# Patient Record
Sex: FEMALE | Race: WHITE | NOT HISPANIC OR LATINO | Employment: FULL TIME | URBAN - METROPOLITAN AREA
[De-identification: names, ages, dates, MRNs, and addresses within clinical notes are randomized per-mention and may not be internally consistent; named-entity substitution may affect disease eponyms.]

---
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
## 2020-07-01 VITALS
SYSTOLIC BLOOD PRESSURE: 136 MMHG | HEART RATE: 102 BPM | DIASTOLIC BLOOD PRESSURE: 88 MMHG | RESPIRATION RATE: 18 BRPM | TEMPERATURE: 98.6 F | WEIGHT: 203 LBS | HEIGHT: 67 IN | OXYGEN SATURATION: 97 % | BODY MASS INDEX: 31.86 KG/M2

## 2020-07-02 ENCOUNTER — HOSPITAL ENCOUNTER (EMERGENCY)
Facility: HOSPITAL | Age: 20
End: 2020-07-02

## 2020-07-09 ENCOUNTER — OFFICE VISIT (OUTPATIENT)
Dept: ORTHOPEDIC SURGERY | Facility: CLINIC | Age: 20
End: 2020-07-09

## 2020-07-09 VITALS — HEIGHT: 67 IN | BODY MASS INDEX: 36.1 KG/M2 | OXYGEN SATURATION: 98 % | HEART RATE: 67 BPM | WEIGHT: 230 LBS

## 2020-07-09 DIAGNOSIS — S62.664A CLOSED NONDISPLACED FRACTURE OF DISTAL PHALANX OF RIGHT RING FINGER, INITIAL ENCOUNTER: ICD-10-CM
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
## 2020-07-09 DIAGNOSIS — M79.644 PAIN OF FINGER OF RIGHT HAND: Primary | ICD-10-CM

## 2020-07-09 DIAGNOSIS — S62.662A CLOSED NONDISPLACED FRACTURE OF DISTAL PHALANX OF RIGHT MIDDLE FINGER, INITIAL ENCOUNTER: ICD-10-CM

## 2020-07-09 PROCEDURE — 99204 OFFICE O/P NEW MOD 45 MIN: CPT | Performed by: PHYSICIAN ASSISTANT
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
## 2020-11-02 ENCOUNTER — HOSPITAL ENCOUNTER (EMERGENCY)
Facility: HOSPITAL | Age: 20
Discharge: HOME | End: 2020-11-02
Attending: EMERGENCY MEDICINE | Admitting: EMERGENCY MEDICINE

## 2020-11-02 VITALS
TEMPERATURE: 97.8 F | RESPIRATION RATE: 16 BRPM | WEIGHT: 200 LBS | SYSTOLIC BLOOD PRESSURE: 119 MMHG | OXYGEN SATURATION: 96 % | HEART RATE: 107 BPM | BODY MASS INDEX: 31.39 KG/M2 | DIASTOLIC BLOOD PRESSURE: 70 MMHG | HEIGHT: 67 IN

## 2020-11-02 DIAGNOSIS — J02.9 ACUTE PHARYNGITIS, UNSPECIFIED ETIOLOGY: Primary | ICD-10-CM

## 2020-11-02 PROCEDURE — 25010000002 PENICILLIN G BENZATHINE PER 1200000 UNITS: Performed by: EMERGENCY MEDICINE

## 2020-11-02 PROCEDURE — 96372 THER/PROPH/DIAG INJ SC/IM: CPT

## 2020-11-02 PROCEDURE — 99283 EMERGENCY DEPT VISIT LOW MDM: CPT

## 2020-11-02 RX ORDER — TRAMADOL HYDROCHLORIDE 50 MG/1
50 TABLET ORAL EVERY 6 HOURS PRN
Qty: 12 TABLET | Refills: 0 | Status: SHIPPED | OUTPATIENT
Start: 2020-11-02 | End: 2022-12-22

## 2020-11-02 RX ORDER — PREDNISONE 20 MG/1
20 TABLET ORAL 3 TIMES DAILY
Qty: 15 TABLET | Refills: 0 | Status: SHIPPED | OUTPATIENT
Start: 2020-11-02 | End: 2022-12-22

## 2020-11-02 RX ADMIN — PENICILLIN G BENZATHINE 1.2 MILLION UNITS: 1200000 INJECTION, SUSPENSION INTRAMUSCULAR at 10:14

## 2020-11-02 NOTE — DISCHARGE INSTRUCTIONS
Take Tylenol or ibuprofen as needed for fever.    Take Ultram as needed for severe pain not controlled by Tylenol or ibuprofen    Take prednisone as prescribed.    Follow-up with primary care physician for recheck in 2 to 3 days and return to the ER with any further concern.

## 2020-11-02 NOTE — ED PROVIDER NOTES
Subjective   20-year-old female presents with a complaint of sore throat.  She reports she began to have as an itchy throat 2 days ago.  There is some mild radiation of pain to the right ear, no reported left ear pain.  Today she noted a temperature of 100.2 and reports is more painful to swallow.  She reports mild runny nose.  She denies any cough, chest pain, or shortness of breath.  She denies any abdominal pain, no nausea or vomiting, no change in bowel or urinary function.  No definitive sick contacts reported.  No other major medical problems.  She has been taking over-the-counter pain, at home without significant relief of symptoms.  No other reported aggravating, alleviating, or associated symptoms.          Review of Systems   Constitutional: Negative for chills, fatigue and fever.   HENT: Positive for rhinorrhea and sore throat. Negative for congestion, ear pain, postnasal drip and sinus pressure.    Eyes: Negative for pain, redness and visual disturbance.   Respiratory: Negative for cough, chest tightness and shortness of breath.    Cardiovascular: Negative for chest pain, palpitations and leg swelling.   Gastrointestinal: Negative for abdominal pain, anal bleeding, blood in stool, diarrhea, nausea and vomiting.   Endocrine: Negative for polydipsia and polyuria.   Genitourinary: Negative for difficulty urinating, dysuria, frequency and urgency.   Musculoskeletal: Negative for arthralgias, back pain and neck pain.   Skin: Negative for pallor and rash.   Allergic/Immunologic: Negative for environmental allergies and immunocompromised state.   Neurological: Negative for dizziness, weakness and headaches.   Hematological: Negative for adenopathy.   Psychiatric/Behavioral: Negative for confusion, self-injury and suicidal ideas. The patient is not nervous/anxious.    All other systems reviewed and are negative.      Past Medical History:   Diagnosis Date   • Anemia    • History of blood transfusion 2012   •  "Miscarriage    • Pregnancy    • Urinary tract infection     \"kept one this pregnancy\"       Allergies   Allergen Reactions   • Tylenol [Acetaminophen] Nausea And Vomiting       History reviewed. No pertinent surgical history.    Family History   Problem Relation Age of Onset   • Diabetes Father    • Cancer Mother        Social History     Socioeconomic History   • Marital status: Single     Spouse name: Not on file   • Number of children: Not on file   • Years of education: Not on file   • Highest education level: Not on file   Tobacco Use   • Smoking status: Current Every Day Smoker     Packs/day: 0.25     Types: Cigarettes   Substance and Sexual Activity   • Alcohol use: No   • Drug use: No   • Sexual activity: Yes     Partners: Male     Birth control/protection: None           Objective   Physical Exam  Vitals signs and nursing note reviewed.   Constitutional:       General: She is not in acute distress.     Appearance: Normal appearance. She is well-developed. She is not toxic-appearing or diaphoretic.   HENT:      Head: Normocephalic and atraumatic.      Right Ear: External ear normal.      Left Ear: External ear normal.      Nose: Nose normal.      Mouth/Throat:      Palate: No mass.      Pharynx: Uvula midline. Pharyngeal swelling and posterior oropharyngeal erythema present.      Tonsils: Tonsillar exudate present.     Eyes:      General: Lids are normal.      Pupils: Pupils are equal, round, and reactive to light.   Neck:      Musculoskeletal: Normal range of motion and neck supple.      Trachea: No tracheal deviation.   Cardiovascular:      Rate and Rhythm: Normal rate and regular rhythm.      Pulses: No decreased pulses.      Heart sounds: Normal heart sounds. No murmur. No friction rub. No gallop.    Pulmonary:      Effort: Pulmonary effort is normal. No respiratory distress.      Breath sounds: Normal breath sounds. No decreased breath sounds, wheezing, rhonchi or rales.   Abdominal:      General: Bowel " sounds are normal.      Palpations: Abdomen is soft.      Tenderness: There is no abdominal tenderness. There is no guarding or rebound.   Musculoskeletal: Normal range of motion.         General: No deformity.   Lymphadenopathy:      Cervical: No cervical adenopathy.   Skin:     General: Skin is warm and dry.      Findings: No rash.   Neurological:      Mental Status: She is alert and oriented to person, place, and time.      Cranial Nerves: No cranial nerve deficit.      Sensory: No sensory deficit.   Psychiatric:         Speech: Speech normal.         Behavior: Behavior normal.         Thought Content: Thought content normal.         Judgment: Judgment normal.         Procedures           ED Course                                           MDM  Number of Diagnoses or Management Options  Acute pharyngitis, unspecified etiology: new and requires workup  Diagnosis management comments: The patient's throat looks concerning for strep versus mono.  Given mono is a virus and do not feel further evaluation for that is warranted at this given time.    I discussed performing a strep swab with the patient versus just treating.  She does desires to pursue just treatment and request a single IM dose of penicillin.    Benzathine penicillin has been ordered.    Patient will be discharged with the advised to take Tylenol or ibuprofen as needed for fever.  Discharged with Ultram to take as needed for severe pain and prednisone to decrease throat swelling.    Advised to follow-up with primary care physician for recheck in 1 week.       Amount and/or Complexity of Data Reviewed  Review and summarize past medical records: yes  Independent visualization of images, tracings, or specimens: yes        Final diagnoses:   Acute pharyngitis, unspecified etiology            Kayleen Elizalde MD  11/02/20 [id number]

## 2022-12-22 ENCOUNTER — INITIAL PRENATAL (OUTPATIENT)
Dept: OBSTETRICS AND GYNECOLOGY | Facility: CLINIC | Age: 22
End: 2022-12-22

## 2022-12-22 VITALS — DIASTOLIC BLOOD PRESSURE: 70 MMHG | SYSTOLIC BLOOD PRESSURE: 112 MMHG | WEIGHT: 206 LBS | BODY MASS INDEX: 32.26 KG/M2

## 2022-12-22 DIAGNOSIS — Z12.4 SCREENING FOR CERVICAL CANCER: ICD-10-CM

## 2022-12-22 DIAGNOSIS — Z34.81 ENCOUNTER FOR SUPERVISION OF OTHER NORMAL PREGNANCY IN FIRST TRIMESTER: Primary | ICD-10-CM

## 2022-12-22 DIAGNOSIS — O36.80X0 ENCOUNTER TO DETERMINE FETAL VIABILITY OF PREGNANCY, SINGLE OR UNSPECIFIED FETUS: ICD-10-CM

## 2022-12-22 PROCEDURE — 99203 OFFICE O/P NEW LOW 30 MIN: CPT | Performed by: MIDWIFE

## 2022-12-22 NOTE — PROGRESS NOTES
"Subjective     Chief Complaint   Patient presents with   • Initial Prenatal Visit     No Complaints/concerns        Sari Newton is a 22 y.o. .  No LMP recorded. Patient is pregnant..  She presents to be seen to initiate prenatal care with our practice. She has had 2 previous uncomplicated pregnancies with . She has decreased her smoking from 1/2 ppd to 4 cigs daily.    Past Medical History:   Diagnosis Date   • Anemia    • History of blood transfusion    • Miscarriage    • Pregnancy    • Urinary tract infection     \"kept one this pregnancy\"     Social History     Socioeconomic History   • Marital status: Single   Tobacco Use   • Smoking status: Every Day     Packs/day: 0.25     Types: Cigarettes   Vaping Use   • Vaping Use: Never used   Substance and Sexual Activity   • Alcohol use: No   • Drug use: No   • Sexual activity: Yes     Partners: Male     Birth control/protection: None         The following portions of the patient's history were reviewed and updated as appropriate:vital signs, allergies, current medications, past medical history, past social history, past surgical history and problem list.    Review of Systems -   /70   Wt 93.4 kg (206 lb)   BMI 32.26 kg/m²   Gastrointestinal: Minimal nausea and vomiting, denies constipation   Genitourinary: Frequency - denies urgency or burning with urination  All other systems reviewed and are negative    Objective     Physical Exam  Constitutional   The patient is alert, well developed & well nourished.   Neck   The neck is supple and the trachea is midline. The thyroid is not enlarged and there are no palpable nodules.   Respiratory  The patient is relaxed and breathes without effort. Lungs CTAB  Cardiovascular  Regular rate and rhythm without murmur -  Negative LE pitting edema  Gastrointestinal   The abdomen is soft and non tender. No hepatosplenomegaly  Genitourinary   - External Genitalia without erythema, lesions, or masses  -Vagina - " There is no abnormal vaginal discharge.   -Cervix without discharge, anterior.  Negative cervical motion tenderness   Uterus - uterine body size is approximate to dates  Adnexa structures are without masses  Perineum is without inflammation or lesion  Skin  Normal color. No rashes or lesions  Extremities  Full ROM. No rashes or edema  Psychiatric  The patient is oriented to person, place, and time. Speech is fluent and words are clear    Imaging   Pelvic ultrasound report  9w4d, + FHT      Assessment & Plan     ASSESSMENT  1. IUP at 9w4d   2. Normal pregnancy  3. smoker  4. First trimester discomforts of pregnancy.     PLAN  1. Tests ordered today:  Orders Placed This Encounter   Procedures   • OB Panel With HIV     Order Specific Question:   Release to patient     Answer:   Routine Release   • Urine Drug Screen - Urine, Clean Catch     Order Specific Question:   Release to patient     Answer:   Routine Release     2. Medications prescribed today:  No orders of the defined types were placed in this encounter.    3. Information reviewed: smoking in pregnancy, exercise in pregnancy, nutrition in pregnancy, weight gain in pregnancy, work and travel restrictions during pregnancy, list of OTC medications acceptable in pregnancy and call coverage groups  4. Genetic testing reviewed: Cystic Fibrosis Screen  5. Najma products, Vit B6 supp, Unisom, or seabands PRN      Follow up: 4 week(s)         This note was electronically signed.    Rakel Eller CNM  12/22/2022

## 2022-12-24 LAB
ABO GROUP BLD: ABNORMAL
AMPHETAMINES UR QL SCN: NEGATIVE NG/ML
BARBITURATES UR QL SCN: NEGATIVE NG/ML
BASOPHILS # BLD AUTO: 0 X10E3/UL (ref 0–0.2)
BASOPHILS NFR BLD AUTO: 0 %
BENZODIAZ UR QL SCN: NEGATIVE NG/ML
BLD GP AB SCN SERPL QL: NEGATIVE
BZE UR QL SCN: NEGATIVE NG/ML
CANNABINOIDS UR QL SCN: NEGATIVE NG/ML
CREAT UR-MCNC: 81.6 MG/DL (ref 20–300)
EOSINOPHIL # BLD AUTO: 0 X10E3/UL (ref 0–0.4)
EOSINOPHIL NFR BLD AUTO: 0 %
ERYTHROCYTE [DISTWIDTH] IN BLOOD BY AUTOMATED COUNT: 11.9 % (ref 11.7–15.4)
HBV SURFACE AG SERPL QL IA: NEGATIVE
HCT VFR BLD AUTO: 40.9 % (ref 34–46.6)
HCV AB S/CO SERPL IA: <0.1 S/CO RATIO (ref 0–0.9)
HGB BLD-MCNC: 14 G/DL (ref 11.1–15.9)
HIV 1+2 AB+HIV1 P24 AG SERPL QL IA: NON REACTIVE
IMM GRANULOCYTES # BLD AUTO: 0 X10E3/UL (ref 0–0.1)
IMM GRANULOCYTES NFR BLD AUTO: 0 %
LABORATORY COMMENT REPORT: NORMAL
LYMPHOCYTES # BLD AUTO: 1.8 X10E3/UL (ref 0.7–3.1)
LYMPHOCYTES NFR BLD AUTO: 22 %
MCH RBC QN AUTO: 34.2 PG (ref 26.6–33)
MCHC RBC AUTO-ENTMCNC: 34.2 G/DL (ref 31.5–35.7)
MCV RBC AUTO: 100 FL (ref 79–97)
METHADONE UR QL SCN: NEGATIVE NG/ML
MONOCYTES # BLD AUTO: 0.6 X10E3/UL (ref 0.1–0.9)
MONOCYTES NFR BLD AUTO: 7 %
NEUTROPHILS # BLD AUTO: 5.7 X10E3/UL (ref 1.4–7)
NEUTROPHILS NFR BLD AUTO: 71 %
OPIATES UR QL SCN: NEGATIVE NG/ML
OXYCODONE+OXYMORPHONE UR QL SCN: NEGATIVE NG/ML
PCP UR QL: NEGATIVE NG/ML
PH UR: 6.3 [PH] (ref 4.5–8.9)
PLATELET # BLD AUTO: 203 X10E3/UL (ref 150–450)
PROPOXYPH UR QL SCN: NEGATIVE NG/ML
RBC # BLD AUTO: 4.09 X10E6/UL (ref 3.77–5.28)
RH BLD: POSITIVE
RPR SER QL: NON REACTIVE
RUBV IGG SERPL IA-ACNC: 5.25 INDEX
WBC # BLD AUTO: 8.2 X10E3/UL (ref 3.4–10.8)

## 2022-12-29 LAB — REF LAB TEST METHOD: NORMAL

## 2023-01-16 ENCOUNTER — ROUTINE PRENATAL (OUTPATIENT)
Dept: OBSTETRICS AND GYNECOLOGY | Facility: CLINIC | Age: 23
End: 2023-01-16
Payer: COMMERCIAL

## 2023-01-16 VITALS — SYSTOLIC BLOOD PRESSURE: 108 MMHG | WEIGHT: 209 LBS | BODY MASS INDEX: 32.73 KG/M2 | DIASTOLIC BLOOD PRESSURE: 68 MMHG

## 2023-01-16 DIAGNOSIS — O21.9 NAUSEA AND VOMITING IN PREGNANCY: ICD-10-CM

## 2023-01-16 DIAGNOSIS — K59.00 CONSTIPATION, UNSPECIFIED CONSTIPATION TYPE: ICD-10-CM

## 2023-01-16 DIAGNOSIS — Z34.91 PRENATAL CARE IN FIRST TRIMESTER: Primary | ICD-10-CM

## 2023-01-16 PROCEDURE — 99214 OFFICE O/P EST MOD 30 MIN: CPT | Performed by: OBSTETRICS & GYNECOLOGY

## 2023-01-16 RX ORDER — DIPHENHYDRAMINE HYDROCHLORIDE 25 MG/1
25 CAPSULE ORAL NIGHTLY
Qty: 30 TABLET | Refills: 5 | Status: SHIPPED | OUTPATIENT
Start: 2023-01-16

## 2023-01-16 RX ORDER — METOCLOPRAMIDE 10 MG/1
10 TABLET ORAL
Qty: 30 TABLET | Refills: 5 | Status: SHIPPED | OUTPATIENT
Start: 2023-01-16

## 2023-01-16 RX ORDER — ALUMINUM ZIRCONIUM OCTACHLOROHYDREX GLY 16 G/100G
GEL TOPICAL DAILY
Qty: 283 G | Refills: 3 | Status: SHIPPED | OUTPATIENT
Start: 2023-01-16

## 2023-01-16 RX ORDER — DOCUSATE SODIUM 100 MG/1
100 CAPSULE, LIQUID FILLED ORAL 2 TIMES DAILY
Qty: 60 CAPSULE | Refills: 1 | Status: SHIPPED | OUTPATIENT
Start: 2023-01-16

## 2023-01-16 NOTE — PROGRESS NOTES
Prenatal Care Visit    Subjective   Chief Complaint   Patient presents with   • Routine Prenatal Visit     Nausea       History:   Sari is a  currently at 13w1d who presents for a prenatal care visit today.    Worsening nausea and emesis and constipation.    Social History    Tobacco Use      Smoking status: Every Day        Packs/day: 0.25        Types: Cigarettes      Smokeless tobacco: Not on file       Objective   /68   Wt 94.8 kg (209 lb)   BMI 32.73 kg/m²   Physical Exam:  Normal, gestational age-appropriate exam today        Plan   Medical Decision Making:    I have reviewed the prenatal labs and ultrasound(s) today. I have reviewed the most recent prenatal progress note(s).    Diagnosis: Supervision of high risk pregnancy   Nausea and emesis  Constipation   Tests/Orders/Rx today: No orders of the defined types were placed in this encounter.      Medication Management: B6/Unisom/Reglan + Metamucil/Colace     Topics discussed: Prenatal care milestones  Nausea + Meds   Constipation + Meds   Tests next visit: U/S for anatomic screening   Next visit: 5 week(s)     Samuel Aaron MD  Obstetrics and Gynecology  [facility]

## 2023-02-20 ENCOUNTER — ROUTINE PRENATAL (OUTPATIENT)
Dept: OBSTETRICS AND GYNECOLOGY | Facility: CLINIC | Age: 23
End: 2023-02-20
Payer: COMMERCIAL

## 2023-02-20 VITALS — WEIGHT: 214.2 LBS | BODY MASS INDEX: 33.55 KG/M2 | DIASTOLIC BLOOD PRESSURE: 64 MMHG | SYSTOLIC BLOOD PRESSURE: 102 MMHG

## 2023-02-20 DIAGNOSIS — Z36.89 ENCOUNTER FOR FETAL ANATOMIC SURVEY: Primary | ICD-10-CM

## 2023-02-20 PROCEDURE — 99213 OFFICE O/P EST LOW 20 MIN: CPT | Performed by: OBSTETRICS & GYNECOLOGY

## 2023-02-20 NOTE — PROGRESS NOTES
Chief Complaint   Patient presents with   • Routine Prenatal Visit     Prenatal visit with Anatomy scan done today. No problems or concerns        HPI:   , 18w1d gestation reports doing well    ROS:  See Prenatal Episode/Flowsheet  /64   Wt 97.2 kg (214 lb 3.2 oz)   BMI 33.55 kg/m²      EXAM:  EXTREMITIES:  No swelling-See Prenatal Episode/Flowsheet    ABDOMEN:  FHTs/Movement noted-See Prenatal Episode/Flowsheet    URINE GLUCOSE/PROTEIN:  See Prenatal Episode/Flowsheet    PELVIC EXAM:  See Prenatal Episode/Flowsheet  CV:  Lungs:  GYN:    MDM:    Lab Results   Component Value Date    HGB 14.0 2022    RUBELLAABIGG 5.25 2022    HEPBSAG Negative 2022    ABO O 2022    RH Positive 2022    ABSCRN Negative 2022    [id number] Non Reactive 2022    HEPCVIRUSABY <0.1 2022    URINECX 50,000-60,000 CFU/mL Normal Urogenital Shannan 2017       U/S: anatomic survey is within normal limits.  Size consistent with dates.  Posterior placenta.  Three-vessel cord.  Breech presentation.  Active male fetus.  3.8 mm bilateral choroid plexus.    1. IUP 18w1d  2. Routine care   3.  Maternity and AFP today

## 2023-02-24 LAB
5P15 DELETION (CRI-DU-CHAT): NEGATIVE
AFP INTERP SERPL-IMP: NORMAL
AFP INTERP SERPL-IMP: NORMAL
AFP MOM SERPL: 0.72
AFP SERPL-MCNC: 26.7 NG/ML
AGE AT DELIVERY: 23.1 YR
CFDNA.FET/CFDNA.TOTAL SFR FETUS: NORMAL %
CITATION REF LAB TEST: NORMAL
FET 13+18+21+X+Y ANEUP PLAS.CFDNA: NEGATIVE
FET 1P36 DEL RISK WBC.DNA+CFDNA QL: NEGATIVE
FET 22Q11.2 DEL RISK WBC.DNA+CFDNA QL: NEGATIVE
FET CHR 11Q23 DEL PLAS.CFDNA QL: NEGATIVE
FET CHR 15Q11 DEL PLAS.CFDNA QL: NEGATIVE
FET CHR 21 TS PLAS.CFDNA QL: NEGATIVE
FET CHR 4P16 DEL PLAS.CFDNA QL: NEGATIVE
FET CHR 8Q24 DEL PLAS.CFDNA QL: NEGATIVE
FET MS X RISK WBC.DNA+CFDNA QL: NEGATIVE
FET SEX PLAS.CFDNA DOSAGE CFDNA: NORMAL
FET TS 13 RISK PLAS.CFDNA QL: NEGATIVE
FET TS 18 RISK WBC.DNA+CFDNA QL: NEGATIVE
FET X + Y ANEUP RISK PLAS.CFDNA SEQ-IMP: NEGATIVE
GA EST FROM CONCEPTION DATE: NORMAL D
GA METHOD: NORMAL
GA: 18.1 WEEKS
GAINS/LOSSES >=7 MB: NEGATIVE
GESTATIONAL AGE > 9:: YES
IDDM PATIENT QL: NO
LAB DIRECTOR NAME PROVIDER: NORMAL
LAB DIRECTOR NAME PROVIDER: NORMAL
LABORATORY COMMENT REPORT: NORMAL
LABORATORY COMMENT REPORT: NORMAL
LIMITATIONS OF THE TEST: NORMAL
MULTIPLE PREGNANCY: NO
NEURAL TUBE DEFECT RISK FETUS: NORMAL %
NOTE: NORMAL
OTHER AUTOSOMAL ANEUPLOIDIES: NEGATIVE
PERFORMANCE CHARACTERISTICS: NORMAL
POSITIVE PREDICTIVE VALUE: NORMAL
REF LAB TEST METHOD: NORMAL
RESULT: NORMAL
TEST PERFORMANCE INFO SPEC: NORMAL

## 2023-03-29 ENCOUNTER — ROUTINE PRENATAL (OUTPATIENT)
Dept: OBSTETRICS AND GYNECOLOGY | Facility: CLINIC | Age: 23
End: 2023-03-29
Payer: COMMERCIAL

## 2023-03-29 VITALS — DIASTOLIC BLOOD PRESSURE: 60 MMHG | WEIGHT: 217 LBS | BODY MASS INDEX: 33.99 KG/M2 | SYSTOLIC BLOOD PRESSURE: 102 MMHG

## 2023-03-29 DIAGNOSIS — Z34.92 PRENATAL CARE IN SECOND TRIMESTER: Primary | ICD-10-CM

## 2023-03-29 NOTE — PROGRESS NOTES
Prenatal Care Visit    Subjective   Chief Complaint   Patient presents with   • Routine Prenatal Visit     No complaints       History:   Sari is a  currently at 23w3d who presents for a prenatal care visit today.    No issues.    Social History    Tobacco Use      Smoking status: Every Day        Packs/day: 0.25        Types: Cigarettes      Smokeless tobacco: Not on file       Objective   /60   Wt 98.4 kg (217 lb)   BMI 33.99 kg/m²   Physical Exam:  Normal, gestational age-appropriate exam today        Plan   Medical Decision Making:    I have reviewed the prenatal labs and ultrasound(s) today. I have reviewed the most recent prenatal progress note(s).    Diagnosis: Supervision of high risk pregnancy   Constipation   Tests/Orders/Rx today: No orders of the defined types were placed in this encounter.      Medication Management: None     Topics discussed: Prenatal care milestones  Glucola    Tests next visit: GCT  HgB   Next visit: 3 week(s)     Samuel Aaron MD  Obstetrics and Gynecology  [facility]

## 2023-04-17 ENCOUNTER — ROUTINE PRENATAL (OUTPATIENT)
Dept: OBSTETRICS AND GYNECOLOGY | Facility: CLINIC | Age: 23
End: 2023-04-17
Payer: COMMERCIAL

## 2023-04-17 VITALS — WEIGHT: 220 LBS | BODY MASS INDEX: 34.46 KG/M2 | DIASTOLIC BLOOD PRESSURE: 70 MMHG | SYSTOLIC BLOOD PRESSURE: 112 MMHG

## 2023-04-17 DIAGNOSIS — Z3A.26 26 WEEKS GESTATION OF PREGNANCY: ICD-10-CM

## 2023-04-17 DIAGNOSIS — Z34.82 ENCOUNTER FOR SUPERVISION OF OTHER NORMAL PREGNANCY IN SECOND TRIMESTER: Primary | ICD-10-CM

## 2023-04-17 LAB
BASOPHILS # BLD AUTO: 0.03 10*3/MM3 (ref 0–0.2)
BASOPHILS NFR BLD AUTO: 0.2 % (ref 0–1.5)
EOSINOPHIL # BLD AUTO: 0.04 10*3/MM3 (ref 0–0.4)
EOSINOPHIL NFR BLD AUTO: 0.3 % (ref 0.3–6.2)
ERYTHROCYTE [DISTWIDTH] IN BLOOD BY AUTOMATED COUNT: 11.8 % (ref 12.3–15.4)
GLUCOSE 1H P 50 G GLC PO SERPL-MCNC: 124 MG/DL (ref 65–139)
HCT VFR BLD AUTO: 34.3 % (ref 34–46.6)
HGB BLD-MCNC: 12 G/DL (ref 12–15.9)
IMM GRANULOCYTES # BLD AUTO: 0.06 10*3/MM3 (ref 0–0.05)
IMM GRANULOCYTES NFR BLD AUTO: 0.5 % (ref 0–0.5)
LYMPHOCYTES # BLD AUTO: 1.93 10*3/MM3 (ref 0.7–3.1)
LYMPHOCYTES NFR BLD AUTO: 14.6 % (ref 19.6–45.3)
MCH RBC QN AUTO: 35.2 PG (ref 26.6–33)
MCHC RBC AUTO-ENTMCNC: 35 G/DL (ref 31.5–35.7)
MCV RBC AUTO: 100.6 FL (ref 79–97)
MONOCYTES # BLD AUTO: 0.73 10*3/MM3 (ref 0.1–0.9)
MONOCYTES NFR BLD AUTO: 5.5 % (ref 5–12)
NEUTROPHILS # BLD AUTO: 10.46 10*3/MM3 (ref 1.7–7)
NEUTROPHILS NFR BLD AUTO: 78.9 % (ref 42.7–76)
NRBC BLD AUTO-RTO: 0 /100 WBC (ref 0–0.2)
PLATELET # BLD AUTO: 198 10*3/MM3 (ref 140–450)
RBC # BLD AUTO: 3.41 10*6/MM3 (ref 3.77–5.28)
WBC # BLD AUTO: 13.25 10*3/MM3 (ref 3.4–10.8)

## 2023-04-17 NOTE — PROGRESS NOTES
Chief Complaint   Patient presents with   • Routine Prenatal Visit     Ankles and feet swelling        HPI: Sari is a  currently at 26w1d here for prenatal visit who reports the following:  Baby is active. Ankles and feet have been swelling. She drinks 1 bottle of water per day. Also drinks pop and tea.                EXAM:     Vitals:    23 0940   BP: 112/70      Abdomen:   See prenatal flowsheet as noted and reviewed, soft, nontender   Pelvic:  See prenatal flowsheet as noted and reviewed   Urine:  See prenatal flowsheet as noted and reviewed    Lab Results   Component Value Date    ABO O 2022    RH Positive 2022    ABSCRN Negative 2022       MDM:  Impression: Supervision of normal pregnancy  Swelling   Tests done today: GCT  HgB   Topics discussed: kick counts and fetal movement  Increase water intake  Reviewed OB labs   Tests next visit: none                RTO:                        4 weeks    This note was electronically signed.  Rakel Eller, APRN  2023

## 2023-05-15 ENCOUNTER — ROUTINE PRENATAL (OUTPATIENT)
Dept: OBSTETRICS AND GYNECOLOGY | Facility: CLINIC | Age: 23
End: 2023-05-15
Payer: COMMERCIAL

## 2023-05-15 VITALS — BODY MASS INDEX: 35.02 KG/M2 | WEIGHT: 223.6 LBS | SYSTOLIC BLOOD PRESSURE: 110 MMHG | DIASTOLIC BLOOD PRESSURE: 60 MMHG

## 2023-05-15 DIAGNOSIS — Z34.93 THIRD TRIMESTER PREGNANCY: Primary | ICD-10-CM

## 2023-05-15 RX ORDER — FAMOTIDINE 20 MG/1
20 TABLET, FILM COATED ORAL 2 TIMES DAILY
Qty: 60 TABLET | Refills: 6 | Status: SHIPPED | OUTPATIENT
Start: 2023-05-15 | End: 2024-05-14

## 2023-05-15 NOTE — PROGRESS NOTES
Chief Complaint   Patient presents with   • Routine Prenatal Visit     Prenatal visit with complaints of worsening heartburn.         HPI:   , 30w1d gestation reports doing well    ROS:  See Prenatal Episode/Flowsheet  /60   Wt 101 kg (223 lb 9.6 oz)   BMI 35.02 kg/m²      EXAM:  EXTREMITIES:  No swelling-See Prenatal Episode/Flowsheet    ABDOMEN:  FHTs/Movement noted-See Prenatal Episode/Flowsheet    URINE GLUCOSE/PROTEIN:  See Prenatal Episode/Flowsheet    PELVIC EXAM:  See Prenatal Episode/Flowsheet  CV:  Lungs:  GYN:    MDM:    Lab Results   Component Value Date    HGB 12.0 2023    RUBELLAABIGG 5.25 2022    HEPBSAG Negative 2022    ABO O 2022    RH Positive 2022    ABSCRN Negative 2022    [id number] Non Reactive 2022    HEPCVIRUSABY <0.1 2022    URINECX 50,000-60,000 CFU/mL Normal Urogenital Shannan 2017       U/S:US Ob 14 + Weeks Single or First Gestation (2023 09:17)      1. IUP 30w1d  2. Routine care   3. GERD: Pepcid 20mg po BID  4. Growth U/S next time

## 2023-05-28 ENCOUNTER — HOSPITAL ENCOUNTER (OUTPATIENT)
Facility: HOSPITAL | Age: 23
Discharge: HOME | End: 2023-05-29
Attending: OBSTETRICS & GYNECOLOGY | Admitting: OBSTETRICS & GYNECOLOGY

## 2023-05-28 VITALS
BODY MASS INDEX: 35.16 KG/M2 | DIASTOLIC BLOOD PRESSURE: 72 MMHG | HEART RATE: 107 BPM | SYSTOLIC BLOOD PRESSURE: 121 MMHG | RESPIRATION RATE: 18 BRPM | HEIGHT: 67 IN | WEIGHT: 224 LBS | TEMPERATURE: 97.9 F

## 2023-05-28 PROCEDURE — 84112 EVAL AMNIOTIC FLUID PROTEIN: CPT | Performed by: OBSTETRICS & GYNECOLOGY

## 2023-05-28 PROCEDURE — G0463 HOSPITAL OUTPT CLINIC VISIT: HCPCS

## 2023-05-28 PROCEDURE — 81001 URINALYSIS AUTO W/SCOPE: CPT | Performed by: OBSTETRICS & GYNECOLOGY

## 2023-05-28 PROCEDURE — 87086 URINE CULTURE/COLONY COUNT: CPT | Performed by: OBSTETRICS & GYNECOLOGY

## 2023-05-29 LAB
BACTERIA UR QL AUTO: ABNORMAL /HPF
BILIRUB UR QL STRIP: NEGATIVE
CLARITY UR: CLEAR
COLOR UR: YELLOW
GLUCOSE UR STRIP-MCNC: NEGATIVE MG/DL
HGB UR QL STRIP.AUTO: NEGATIVE
HYALINE CASTS UR QL AUTO: ABNORMAL /LPF
KETONES UR QL STRIP: NEGATIVE
LEUKOCYTE ESTERASE UR QL STRIP.AUTO: ABNORMAL
NITRITE UR QL STRIP: NEGATIVE
PH UR STRIP.AUTO: 6 [PH] (ref 5–8)
POC AMNISURE: NEGATIVE
PROT UR QL STRIP: NEGATIVE
RBC # UR STRIP: ABNORMAL /HPF
REF LAB TEST METHOD: ABNORMAL
SP GR UR STRIP: 1.02 (ref 1–1.03)
SQUAMOUS #/AREA URNS HPF: ABNORMAL /HPF
UROBILINOGEN UR QL STRIP: ABNORMAL
WBC # UR STRIP: ABNORMAL /HPF

## 2023-05-29 PROCEDURE — 59025 FETAL NON-STRESS TEST: CPT

## 2023-05-29 NOTE — H&P
ARPIT Kearns  Obstetric History and Physical    Chief Complaint   Patient presents with   • Leaking Fluid       HPI:      Patient is a 22 y.o. female  currently at 32w1d, who presents with a concern that she may have broken her water.   She had a small gush that she says ran down her leg.   She has no current leaking.  Denies vaginal bleeding and contractions.  +FM.   No other complaints.         The following portions of the patients history were reviewed and updated as appropriate: current medications, allergies, past medical history, past surgical history, past family history, past social history and problem list .       Prenatal Information:   Maternal Prenatal Labs  Blood Type No results found for: ABO   Rh Status No results found for: RH   Antibody Screen No results found for: ABSCRN   Gonnorhea No results found for: GCCX   Chlamydia No results found for: CLAMYDCU   RPR No results found for: RPR   Syphilis Antibody No results found for: SYPHILIS   Rubella No results found for: RUBELLAIGGIN   Hepatitis B Surface Antigen No results found for: HEPBSAG   HIV-1 Antibody No results found for: LABHIV1   Hepatitis C Antibody No results found for: HEPCAB   Rapid Urin Drug Screen No results found for: AMPMETHU, BARBITSCNUR, LABBENZSCN, LABMETHSCN, LABOPIASCN, THCURSCR, COCAINEUR, AMPHETSCREEN, PROPOXSCN, BUPRENORSCNU, METAMPSCNUR, OXYCODONESCN, TRICYCLICSCN   Group B Strep Culture No results found for: GBSANTIGEN           External Prenatal Results     Pregnancy Outside Results - Transcribed From Office Records - See Scanned Records For Details     Test Value Date Time    ABO  O  22 1140    Rh  Positive  22 1140    Antibody Screen  Negative  22 1140    Varicella IgG       Rubella  5.25 index 22 1140    Hgb  12.0 g/dL 23 1033       14.0 g/dL 22 1140    Hct  34.3 % 23 1033       40.9 % 22 1140    Glucose Fasting GTT       Glucose Tolerance Test 1 hour       Glucose  "Tolerance Test 3 hour       Gonorrhea (discrete)  Negative  17 1220    Chlamydia (discrete)  Negative  17 1220    RPR  Non Reactive  22 1140    VDRL       Syphilis Antibody       HBsAg  Negative  22 1140    Herpes Simplex Virus PCR       Herpes Simplex VIrus Culture       HIV  Non Reactive  22 1140    Hep C RNA Quant PCR       Hep C Antibody  <0.1 s/co ratio 22 1140    AFP  26.7 ng/mL 23 0924    Group B Strep       GBS Susceptibility to Clindamycin       GBS Susceptibility to Erythromycin       Fetal Fibronectin       Genetic Testing, Maternal Blood             Drug Screening     Test Value Date Time    Urine Drug Screen       Amphetamine Screen  Negative ng/mL 22 1140    Barbiturate Screen  Negative ng/mL 22 1140    Benzodiazepine Screen  Negative ng/mL 22 1140    Methadone Screen  Negative ng/mL 22 1140    Phencyclidine Screen  Negative ng/mL 22 1140    Opiates Screen  Negative  17 1505    THC Screen  Negative  17 1505    Cocaine Screen       Propoxyphene Screen  Negative ng/mL 22 1140    Buprenorphine Screen  Negative  17 1505    Methamphetamine Screen       Oxycodone Screen  Negative  17 1505    Tricyclic Antidepressants Screen  Negative  17 1505          Legend    ^: Historical                          Past OB History:     OB History    Para Term  AB Living   4 2 2 0 1 2   SAB IAB Ectopic Molar Multiple Live Births   1 0 0 0 0 2      # Outcome Date GA Lbr Govind/2nd Weight Sex Delivery Anes PTL Lv   4 Current            3 Term 19 39w0d  3487 g (7 lb 11 oz) F Vag-Spont   MARGRET   2 Term 10/06/17 40w0d  3742 g (8 lb 4 oz) M Vag-Spont   MARGRET   1 SAB 10/01/15 11w5d              Past Medical History: Past Medical History:   Diagnosis Date   • Anemia    • History of blood transfusion 2012   • Miscarriage    • Postpartum depression     1st baby   • Pregnancy    • Urinary tract infection     \"kept " "one this pregnancy\"      Past Surgical History Past Surgical History:   Procedure Laterality Date   • COLONOSCOPY     • ENDOSCOPY     • OTHER SURGICAL HISTORY  2012    blood transfusion      Family History: Family History   Problem Relation Age of Onset   • Diabetes Father    • Cancer Mother       Social History:  reports that she has been smoking cigarettes. She has been smoking an average of .5 packs per day. She does not have any smokeless tobacco history on file.   reports no history of alcohol use.   reports no history of drug use.        Review of Systems  Denies fever, HA, CP, Shortness of air, muscle weakness, and rashes      Objective     Vital Signs Range for the last 24 hours  Temperature: Temp:  [97.9 °F (36.6 °C)] 97.9 °F (36.6 °C)   Temp Source: Temp src: Oral   BP: BP: (121)/(72) 121/72   Pulse: Heart Rate:  [107] 107   Respirations: Resp:  [18] 18   SPO2:     O2 Amount (l/min):     O2 Devices     Weight: Weight:  [102 kg (224 lb)] 102 kg (224 lb)     Physical Examination: General appearance - alert, well appearing, and in no distress and oriented to person, place, and time  Chest - clear to auscultation, no wheezes, rales or rhonchi, symmetric air entry  Heart - S1 and S2 normal, no murmurs noted  Abdomen - soft, nontender, nondistended, no masses or organomegaly  no rebound tenderness noted  bowel sounds normal  No guarding, No RUQ pain  Extremities - pedal edema  - none                           Fetal Heart Rate Assessment   Method:     Beats/min:     Baseline:  130s   Varibility:  mod   Accels:  y   Decels:  n   Tracing Category:  1     Uterine Assessment   Method:     Frequency (min):  None    Ctx Count in 10 min:     Duration:     Intensity:     Intensity by IUPC:     Resting Tone:     Resting Tone by IUPC:     [place] Units:      NST                     Indication: vaginal leaking   Start time 2344                 End time 0015  15 x 15 accels x 2  Yes  No decels  Baseline   130s  Reactive " NST - Yes     AmniSure:    NEGATIVE         Assessment/Plan  1. Intrauterine pregnancy at 32w1d weeks gestation with reactive fetal status  2.  Vaginal discharge with no evidence of PPROM   3.  Given reassurance and education   4.  Questions answered  5.  D/C home.         Theo Hawkins MD  5/29/2023  00:21 EDT

## 2023-05-30 ENCOUNTER — ROUTINE PRENATAL (OUTPATIENT)
Dept: OBSTETRICS AND GYNECOLOGY | Facility: CLINIC | Age: 23
End: 2023-05-30

## 2023-05-30 VITALS — WEIGHT: 225 LBS | SYSTOLIC BLOOD PRESSURE: 114 MMHG | DIASTOLIC BLOOD PRESSURE: 62 MMHG | BODY MASS INDEX: 35.24 KG/M2

## 2023-05-30 DIAGNOSIS — Z34.83 ENCOUNTER FOR SUPERVISION OF OTHER NORMAL PREGNANCY IN THIRD TRIMESTER: Primary | ICD-10-CM

## 2023-05-30 DIAGNOSIS — O35.03X0 CHOROID PLEXUS CYST OF FETUS AFFECTING CARE OF MOTHER, ANTEPARTUM, SINGLE OR UNSPECIFIED FETUS: ICD-10-CM

## 2023-05-30 LAB — BACTERIA SPEC AEROBE CULT: NO GROWTH

## 2023-05-30 NOTE — PROGRESS NOTES
Prenatal Care Visit    Subjective   Chief Complaint   Patient presents with   • Routine Prenatal Visit     History:   Sari is a  currently at 32w2d who presents for a prenatal care visit today.    Doing well. Denies CTX, VB, LOF. She did worry she had broken her water and presented to [facility] over the weekend, but was ruled out for rupture. Reports (+) FM.     Objective   /62   Wt 102 kg (225 lb)   BMI 35.24 kg/m²   Physical Exam:  Normal, gestational age-appropriate exam today      Assessment & Plan     1. IUP @ 32w2d  2. Routine care: I have reviewed the prenatal labs and ultrasound(s) today. I have reviewed the most recent prenatal progress note(s). Growth sono today - EFW 2142g (69%), AC 80%, vertex, posterior placenta, KAVITHA 16.8 cm.  3. Fetal bilateral choroid plexus cysts: not visualized on today's ultrasound     Diagnosis Plan   1. Encounter for supervision of other normal pregnancy in third trimester        2. Choroid plexus cyst of fetus affecting care of mother, antepartum, single or unspecified fetus           Medication Management: continue PNV    Topics discussed: Prenatal care milestones  Kick counts and fetal movement   labor signs and symptoms   Tests next visit: none   Next visit: 2 week(s)     Mayda Kurtz MD  Obstetrics and Gynecology  [facility]

## 2023-06-12 ENCOUNTER — ROUTINE PRENATAL (OUTPATIENT)
Dept: OBSTETRICS AND GYNECOLOGY | Facility: CLINIC | Age: 23
End: 2023-06-12
Payer: COMMERCIAL

## 2023-06-12 VITALS — DIASTOLIC BLOOD PRESSURE: 72 MMHG | WEIGHT: 227 LBS | BODY MASS INDEX: 35.55 KG/M2 | SYSTOLIC BLOOD PRESSURE: 124 MMHG

## 2023-06-12 DIAGNOSIS — Z34.93 THIRD TRIMESTER PREGNANCY: Primary | ICD-10-CM

## 2023-06-12 DIAGNOSIS — O36.63X0 EXCESSIVE FETAL GROWTH AFFECTING MANAGEMENT OF PREGNANCY IN THIRD TRIMESTER, SINGLE OR UNSPECIFIED FETUS: ICD-10-CM

## 2023-06-12 PROCEDURE — 99213 OFFICE O/P EST LOW 20 MIN: CPT | Performed by: OBSTETRICS & GYNECOLOGY

## 2023-06-12 RX ORDER — OMEPRAZOLE 20 MG/1
20 CAPSULE, DELAYED RELEASE ORAL DAILY
Qty: 30 CAPSULE | Refills: 6 | Status: SHIPPED | OUTPATIENT
Start: 2023-06-12

## 2023-06-12 NOTE — PROGRESS NOTES
Chief Complaint   Patient presents with    Routine Prenatal Visit     Heartburn, vomiting        HPI:   , 34w1d gestation reports doing well    ROS:  See Prenatal Episode/Flowsheet  /72   Wt 103 kg (227 lb)   BMI 35.55 kg/m²      EXAM:  EXTREMITIES:  No swelling-See Prenatal Episode/Flowsheet    ABDOMEN:  FHTs/Movement noted-See Prenatal Episode/Flowsheet    URINE GLUCOSE/PROTEIN:  See Prenatal Episode/Flowsheet    PELVIC EXAM:  See Prenatal Episode/Flowsheet  CV:  Lungs:  GYN:    MDM:    Lab Results   Component Value Date    HGB 12.0 2023    RUBELLAABIGG 5.25 2022    HEPBSAG Negative 2022    ABO O 2022    RH Positive 2022    ABSCRN Negative 2022    [id number] Non Reactive 2022    HEPCVIRUSABY <0.1 2022    URINECX No growth 2023       U/S:US Ob Follow Up Transabdominal Approach (2023 13:54)     1. IUP 34w1d  2. Routine care   3. LGA (AC 80%): repeat scan next time with GBS    Proven to 8#4

## 2023-07-17 ENCOUNTER — HOSPITAL ENCOUNTER (INPATIENT)
Facility: HOSPITAL | Age: 23
LOS: 1 days | Discharge: HOME | End: 2023-07-18
Attending: OBSTETRICS & GYNECOLOGY | Admitting: OBSTETRICS & GYNECOLOGY
Payer: COMMERCIAL

## 2023-07-17 DIAGNOSIS — Z34.90 ENCOUNTER FOR INDUCTION OF LABOR: ICD-10-CM

## 2023-07-17 LAB
ABO GROUP BLD: NORMAL
BASOPHILS # BLD AUTO: 0.03 10*3/MM3 (ref 0–0.2)
BASOPHILS NFR BLD AUTO: 0.2 % (ref 0–1.5)
BLD GP AB SCN SERPL QL: NEGATIVE
DEPRECATED RDW RBC AUTO: 46 FL (ref 37–54)
EOSINOPHIL # BLD AUTO: 0.06 10*3/MM3 (ref 0–0.4)
EOSINOPHIL NFR BLD AUTO: 0.5 % (ref 0.3–6.2)
ERYTHROCYTE [DISTWIDTH] IN BLOOD BY AUTOMATED COUNT: 13.7 % (ref 12.3–15.4)
HCT VFR BLD AUTO: 34.6 % (ref 34–46.6)
HGB BLD-MCNC: 11.6 G/DL (ref 12–15.9)
IMM GRANULOCYTES # BLD AUTO: 0.1 10*3/MM3 (ref 0–0.05)
IMM GRANULOCYTES NFR BLD AUTO: 0.8 % (ref 0–0.5)
LYMPHOCYTES # BLD AUTO: 2.08 10*3/MM3 (ref 0.7–3.1)
LYMPHOCYTES NFR BLD AUTO: 16.6 % (ref 19.6–45.3)
MCH RBC QN AUTO: 30.8 PG (ref 26.6–33)
MCHC RBC AUTO-ENTMCNC: 33.5 G/DL (ref 31.5–35.7)
MCV RBC AUTO: 91.8 FL (ref 79–97)
MONOCYTES # BLD AUTO: 1.17 10*3/MM3 (ref 0.1–0.9)
MONOCYTES NFR BLD AUTO: 9.3 % (ref 5–12)
NEUTROPHILS NFR BLD AUTO: 72.6 % (ref 42.7–76)
NEUTROPHILS NFR BLD AUTO: 9.1 10*3/MM3 (ref 1.7–7)
NRBC BLD AUTO-RTO: 0 /100 WBC (ref 0–0.2)
PLATELET # BLD AUTO: 187 10*3/MM3 (ref 140–450)
PMV BLD AUTO: 10.8 FL (ref 6–12)
RBC # BLD AUTO: 3.77 10*6/MM3 (ref 3.77–5.28)
RH BLD: POSITIVE
T&S EXPIRATION DATE: NORMAL
WBC NRBC COR # BLD: 12.54 10*3/MM3 (ref 3.4–10.8)

## 2023-07-17 PROCEDURE — 85025 COMPLETE CBC W/AUTO DIFF WBC: CPT | Performed by: OBSTETRICS & GYNECOLOGY

## 2023-07-17 PROCEDURE — 86901 BLOOD TYPING SEROLOGIC RH(D): CPT | Performed by: OBSTETRICS & GYNECOLOGY

## 2023-07-17 PROCEDURE — 25010000002 METHYLERGONOVINE MALEATE PER 0.2 MG

## 2023-07-17 PROCEDURE — 51703 INSERT BLADDER CATH COMPLEX: CPT

## 2023-07-17 PROCEDURE — 10907ZC DRAINAGE OF AMNIOTIC FLUID, THERAPEUTIC FROM PRODUCTS OF CONCEPTION, VIA NATURAL OR ARTIFICIAL OPENING: ICD-10-PCS | Performed by: OBSTETRICS & GYNECOLOGY

## 2023-07-17 PROCEDURE — 86900 BLOOD TYPING SEROLOGIC ABO: CPT | Performed by: OBSTETRICS & GYNECOLOGY

## 2023-07-17 PROCEDURE — 59410 OBSTETRICAL CARE: CPT | Performed by: OBSTETRICS & GYNECOLOGY

## 2023-07-17 PROCEDURE — 86850 RBC ANTIBODY SCREEN: CPT | Performed by: OBSTETRICS & GYNECOLOGY

## 2023-07-17 RX ORDER — METHYLERGONOVINE MALEATE 0.2 MG/ML
INJECTION INTRAVENOUS
Status: COMPLETED
Start: 2023-07-17 | End: 2023-07-17

## 2023-07-17 RX ORDER — ONDANSETRON 2 MG/ML
4 INJECTION INTRAMUSCULAR; INTRAVENOUS EVERY 6 HOURS PRN
Status: DISCONTINUED | OUTPATIENT
Start: 2023-07-17 | End: 2023-07-17 | Stop reason: HOSPADM

## 2023-07-17 RX ORDER — MISOPROSTOL 200 UG/1
800 TABLET ORAL AS NEEDED
Status: DISCONTINUED | OUTPATIENT
Start: 2023-07-17 | End: 2023-07-17 | Stop reason: HOSPADM

## 2023-07-17 RX ORDER — HYDROCODONE BITARTRATE AND ACETAMINOPHEN 10; 325 MG/1; MG/1
1 TABLET ORAL EVERY 4 HOURS PRN
Status: DISCONTINUED | OUTPATIENT
Start: 2023-07-17 | End: 2023-07-18 | Stop reason: HOSPADM

## 2023-07-17 RX ORDER — SODIUM CHLORIDE 0.9 % (FLUSH) 0.9 %
1-10 SYRINGE (ML) INJECTION AS NEEDED
Status: DISCONTINUED | OUTPATIENT
Start: 2023-07-17 | End: 2023-07-17 | Stop reason: HOSPADM

## 2023-07-17 RX ORDER — LIDOCAINE HYDROCHLORIDE 10 MG/ML
5 INJECTION, SOLUTION EPIDURAL; INFILTRATION; INTRACAUDAL; PERINEURAL AS NEEDED
Status: DISCONTINUED | OUTPATIENT
Start: 2023-07-17 | End: 2023-07-17 | Stop reason: HOSPADM

## 2023-07-17 RX ORDER — ONDANSETRON 4 MG/1
4 TABLET, FILM COATED ORAL EVERY 6 HOURS PRN
Status: DISCONTINUED | OUTPATIENT
Start: 2023-07-17 | End: 2023-07-17 | Stop reason: HOSPADM

## 2023-07-17 RX ORDER — SODIUM CHLORIDE 0.9 % (FLUSH) 0.9 %
10 SYRINGE (ML) INJECTION EVERY 12 HOURS SCHEDULED
Status: DISCONTINUED | OUTPATIENT
Start: 2023-07-17 | End: 2023-07-17 | Stop reason: HOSPADM

## 2023-07-17 RX ORDER — PROMETHAZINE HYDROCHLORIDE 12.5 MG/1
12.5 SUPPOSITORY RECTAL EVERY 6 HOURS PRN
Status: DISCONTINUED | OUTPATIENT
Start: 2023-07-17 | End: 2023-07-18 | Stop reason: HOSPADM

## 2023-07-17 RX ORDER — CARBOPROST TROMETHAMINE 250 UG/ML
250 INJECTION, SOLUTION INTRAMUSCULAR ONCE AS NEEDED
Status: DISCONTINUED | OUTPATIENT
Start: 2023-07-17 | End: 2023-07-17 | Stop reason: HOSPADM

## 2023-07-17 RX ORDER — MORPHINE SULFATE 4 MG/ML
4 INJECTION, SOLUTION INTRAMUSCULAR; INTRAVENOUS EVERY 4 HOURS PRN
Status: DISCONTINUED | OUTPATIENT
Start: 2023-07-17 | End: 2023-07-17 | Stop reason: HOSPADM

## 2023-07-17 RX ORDER — DIPHENHYDRAMINE HCL 25 MG
25 CAPSULE ORAL NIGHTLY PRN
Status: DISCONTINUED | OUTPATIENT
Start: 2023-07-17 | End: 2023-07-18 | Stop reason: HOSPADM

## 2023-07-17 RX ORDER — OXYTOCIN/0.9 % SODIUM CHLORIDE 30/500 ML
2-20 PLASTIC BAG, INJECTION (ML) INTRAVENOUS
Status: DISCONTINUED | OUTPATIENT
Start: 2023-07-17 | End: 2023-07-17 | Stop reason: HOSPADM

## 2023-07-17 RX ORDER — IBUPROFEN 600 MG/1
600 TABLET ORAL EVERY 6 HOURS PRN
Status: DISCONTINUED | OUTPATIENT
Start: 2023-07-17 | End: 2023-07-18 | Stop reason: HOSPADM

## 2023-07-17 RX ORDER — MORPHINE SULFATE 2 MG/ML
2 INJECTION, SOLUTION INTRAMUSCULAR; INTRAVENOUS ONCE AS NEEDED
Status: DISCONTINUED | OUTPATIENT
Start: 2023-07-17 | End: 2023-07-17 | Stop reason: HOSPADM

## 2023-07-17 RX ORDER — EPHEDRINE SULFATE 5 MG/ML
10 INJECTION INTRAVENOUS
Status: DISCONTINUED | OUTPATIENT
Start: 2023-07-17 | End: 2023-07-17 | Stop reason: HOSPADM

## 2023-07-17 RX ORDER — ACETAMINOPHEN 325 MG/1
650 TABLET ORAL EVERY 6 HOURS PRN
Status: DISCONTINUED | OUTPATIENT
Start: 2023-07-17 | End: 2023-07-18 | Stop reason: HOSPADM

## 2023-07-17 RX ORDER — METHYLERGONOVINE MALEATE 0.2 MG/ML
200 INJECTION INTRAVENOUS ONCE AS NEEDED
Status: COMPLETED | OUTPATIENT
Start: 2023-07-17 | End: 2023-07-17

## 2023-07-17 RX ORDER — ONDANSETRON 2 MG/ML
4 INJECTION INTRAMUSCULAR; INTRAVENOUS EVERY 6 HOURS PRN
Status: DISCONTINUED | OUTPATIENT
Start: 2023-07-17 | End: 2023-07-18 | Stop reason: HOSPADM

## 2023-07-17 RX ORDER — PROMETHAZINE HYDROCHLORIDE 12.5 MG/1
12.5 SUPPOSITORY RECTAL EVERY 6 HOURS PRN
Status: DISCONTINUED | OUTPATIENT
Start: 2023-07-17 | End: 2023-07-17 | Stop reason: HOSPADM

## 2023-07-17 RX ORDER — SODIUM CHLORIDE, SODIUM LACTATE, POTASSIUM CHLORIDE, CALCIUM CHLORIDE 600; 310; 30; 20 MG/100ML; MG/100ML; MG/100ML; MG/100ML
125 INJECTION, SOLUTION INTRAVENOUS CONTINUOUS
Status: DISCONTINUED | OUTPATIENT
Start: 2023-07-17 | End: 2023-07-17

## 2023-07-17 RX ORDER — HYDROCORTISONE 25 MG/G
CREAM TOPICAL AS NEEDED
Status: DISCONTINUED | OUTPATIENT
Start: 2023-07-17 | End: 2023-07-18 | Stop reason: HOSPADM

## 2023-07-17 RX ORDER — PRENATAL VIT/IRON FUM/FOLIC AC 27MG-0.8MG
1 TABLET ORAL DAILY
Status: DISCONTINUED | OUTPATIENT
Start: 2023-07-17 | End: 2023-07-18 | Stop reason: HOSPADM

## 2023-07-17 RX ORDER — ONDANSETRON 4 MG/1
4 TABLET, FILM COATED ORAL ONCE AS NEEDED
Status: DISCONTINUED | OUTPATIENT
Start: 2023-07-17 | End: 2023-07-17 | Stop reason: HOSPADM

## 2023-07-17 RX ORDER — OXYTOCIN/0.9 % SODIUM CHLORIDE 30/500 ML
999 PLASTIC BAG, INJECTION (ML) INTRAVENOUS ONCE
Status: COMPLETED | OUTPATIENT
Start: 2023-07-17 | End: 2023-07-17

## 2023-07-17 RX ORDER — PROMETHAZINE HYDROCHLORIDE 25 MG/1
25 TABLET ORAL EVERY 6 HOURS PRN
Status: DISCONTINUED | OUTPATIENT
Start: 2023-07-17 | End: 2023-07-18 | Stop reason: HOSPADM

## 2023-07-17 RX ORDER — SODIUM CHLORIDE 0.9 % (FLUSH) 0.9 %
1-10 SYRINGE (ML) INJECTION AS NEEDED
Status: DISCONTINUED | OUTPATIENT
Start: 2023-07-17 | End: 2023-07-18 | Stop reason: HOSPADM

## 2023-07-17 RX ORDER — ONDANSETRON 2 MG/ML
4 INJECTION INTRAMUSCULAR; INTRAVENOUS ONCE AS NEEDED
Status: DISCONTINUED | OUTPATIENT
Start: 2023-07-17 | End: 2023-07-17 | Stop reason: HOSPADM

## 2023-07-17 RX ORDER — PROMETHAZINE HYDROCHLORIDE 12.5 MG/1
12.5 TABLET ORAL EVERY 6 HOURS PRN
Status: DISCONTINUED | OUTPATIENT
Start: 2023-07-17 | End: 2023-07-17 | Stop reason: HOSPADM

## 2023-07-17 RX ORDER — HYDROCODONE BITARTRATE AND ACETAMINOPHEN 5; 325 MG/1; MG/1
1 TABLET ORAL EVERY 4 HOURS PRN
Status: DISCONTINUED | OUTPATIENT
Start: 2023-07-17 | End: 2023-07-18 | Stop reason: HOSPADM

## 2023-07-17 RX ORDER — ACETAMINOPHEN 325 MG/1
650 TABLET ORAL EVERY 4 HOURS PRN
Status: DISCONTINUED | OUTPATIENT
Start: 2023-07-17 | End: 2023-07-17 | Stop reason: HOSPADM

## 2023-07-17 RX ORDER — ACETAMINOPHEN 325 MG/1
650 TABLET ORAL ONCE AS NEEDED
Status: DISCONTINUED | OUTPATIENT
Start: 2023-07-17 | End: 2023-07-17 | Stop reason: HOSPADM

## 2023-07-17 RX ORDER — OXYTOCIN/0.9 % SODIUM CHLORIDE 30/500 ML
250 PLASTIC BAG, INJECTION (ML) INTRAVENOUS CONTINUOUS
Status: ACTIVE | OUTPATIENT
Start: 2023-07-17 | End: 2023-07-17

## 2023-07-17 RX ORDER — MORPHINE SULFATE 4 MG/ML
4 INJECTION, SOLUTION INTRAMUSCULAR; INTRAVENOUS ONCE AS NEEDED
Status: DISCONTINUED | OUTPATIENT
Start: 2023-07-17 | End: 2023-07-17 | Stop reason: HOSPADM

## 2023-07-17 RX ORDER — FENTANYL CITRATE 50 UG/ML
INJECTION, SOLUTION INTRAMUSCULAR; INTRAVENOUS
Status: COMPLETED
Start: 2023-07-17 | End: 2023-07-17

## 2023-07-17 RX ORDER — MAGNESIUM CARB/ALUMINUM HYDROX 105-160MG
30 TABLET,CHEWABLE ORAL DAILY PRN
Status: DISCONTINUED | OUTPATIENT
Start: 2023-07-17 | End: 2023-07-17

## 2023-07-17 RX ORDER — MORPHINE SULFATE 2 MG/ML
6 INJECTION, SOLUTION INTRAMUSCULAR; INTRAVENOUS
Status: DISCONTINUED | OUTPATIENT
Start: 2023-07-17 | End: 2023-07-17 | Stop reason: HOSPADM

## 2023-07-17 RX ORDER — DOCUSATE SODIUM 100 MG/1
100 CAPSULE, LIQUID FILLED ORAL 2 TIMES DAILY PRN
Status: DISCONTINUED | OUTPATIENT
Start: 2023-07-17 | End: 2023-07-18 | Stop reason: HOSPADM

## 2023-07-17 RX ORDER — ONDANSETRON 4 MG/1
4 TABLET, FILM COATED ORAL EVERY 8 HOURS PRN
Status: DISCONTINUED | OUTPATIENT
Start: 2023-07-17 | End: 2023-07-18 | Stop reason: HOSPADM

## 2023-07-17 RX ORDER — BISACODYL 10 MG
10 SUPPOSITORY, RECTAL RECTAL DAILY PRN
Status: DISCONTINUED | OUTPATIENT
Start: 2023-07-18 | End: 2023-07-18 | Stop reason: HOSPADM

## 2023-07-17 RX ADMIN — ACETAMINOPHEN 650 MG: 325 TABLET, FILM COATED ORAL at 19:41

## 2023-07-17 RX ADMIN — METHYLERGONOVINE MALEATE 200 MCG: 0.2 INJECTION, SOLUTION INTRAMUSCULAR; INTRAVENOUS at 12:21

## 2023-07-17 RX ADMIN — Medication 999 ML/HR: at 12:16

## 2023-07-17 RX ADMIN — SODIUM CHLORIDE, POTASSIUM CHLORIDE, SODIUM LACTATE AND CALCIUM CHLORIDE 1000 ML: 600; 310; 30; 20 INJECTION, SOLUTION INTRAVENOUS at 08:38

## 2023-07-17 RX ADMIN — METHYLERGONOVINE MALEATE 200 MCG: 0.2 INJECTION INTRAVENOUS at 12:21

## 2023-07-17 RX ADMIN — SODIUM CHLORIDE, POTASSIUM CHLORIDE, SODIUM LACTATE AND CALCIUM CHLORIDE 1000 ML: 600; 310; 30; 20 INJECTION, SOLUTION INTRAVENOUS at 06:32

## 2023-07-17 RX ADMIN — Medication 250 ML/HR: at 12:31

## 2023-07-17 RX ADMIN — Medication 2 MILLI-UNITS/MIN: at 07:15

## 2023-07-17 RX ADMIN — SODIUM CHLORIDE, POTASSIUM CHLORIDE, SODIUM LACTATE AND CALCIUM CHLORIDE 125 ML/HR: 600; 310; 30; 20 INJECTION, SOLUTION INTRAVENOUS at 07:15

## 2023-07-17 NOTE — PLAN OF CARE
Problem: Adult Inpatient Plan of Care  Goal: Plan of Care Review  Outcome: Ongoing, Progressing  Goal: Patient-Specific Goal (Individualized)  Outcome: Ongoing, Progressing  Goal: Absence of Hospital-Acquired Illness or Injury  Outcome: Ongoing, Progressing  Goal: Optimal Comfort and Wellbeing  Outcome: Ongoing, Progressing  Intervention: Provide Person-Centered Care  Recent Flowsheet Documentation  Taken 7/17/2023 1145 by Debbie Rao RN  Trust Relationship/Rapport:   care explained   choices provided   emotional support provided   empathic listening provided   questions answered   questions encouraged   thoughts/feelings acknowledged   reassurance provided  Taken 7/17/2023 1023 by Debbie Rao RN  Trust Relationship/Rapport:   care explained   choices provided   emotional support provided   empathic listening provided   questions answered   questions encouraged   reassurance provided   thoughts/feelings acknowledged  Taken 7/17/2023 0915 by Debbie Rao RN  Trust Relationship/Rapport:   care explained   choices provided   emotional support provided   empathic listening provided   questions answered   questions encouraged   reassurance provided   thoughts/feelings acknowledged  Taken 7/17/2023 0755 by Debbie Rao RN  Trust Relationship/Rapport:   care explained   choices provided   emotional support provided   empathic listening provided   questions answered   questions encouraged   reassurance provided   thoughts/feelings acknowledged  Goal: Readiness for Transition of Care  Outcome: Ongoing, Progressing     Problem: Breastfeeding  Goal: Effective Breastfeeding  Outcome: Ongoing, Progressing     Problem: Bleeding (Labor)  Goal: Hemostasis  Outcome: Met     Problem: Change in Fetal Wellbeing (Labor)  Goal: Stable Fetal Wellbeing  Outcome: Met     Problem: Delayed Labor Progression (Labor)  Goal: Effective Progression to Delivery  Outcome: Met     Problem: Infection (Labor)  Goal: Absence of Infection Signs  and Symptoms  Outcome: Met     Problem: Labor Pain (Labor)  Goal: Acceptable Pain Control  Outcome: Met     Problem: Uterine Tachysystole (Labor)  Goal: Normal Uterine Contraction Pattern  Outcome: Met   Goal Outcome Evaluation:

## 2023-07-17 NOTE — NON STRESS TEST
Non Stress Test    [facility]    Patient: Sari Newton  : 2000  MRN: [id number]  CSN: [id number]    Gestational Age: 39w1d    Indication for NST Irregular uterine contractions  Induction of labor       Time On 06:08   Time Off 07:00       Interpretation    Baseline 's beats per minute   Category 1   Decelerations Absent       Additional Comments See nursing notes       Recommendations for f/u See nursing notes       This note has been electronically signed.    Delaney Barth M.D.

## 2023-07-17 NOTE — LACTATION NOTE
Spoke to patient about breast feeding. Patient wants to pump and bottle feed. Discussed importance of beginning early and hand expression vs. Manual pump vs. Electric pump. Patient has breast pump at home obtained through insurance. Will need to use hospitals breast pump during stay at hospital.

## 2023-07-17 NOTE — H&P
"ARPIT Roly Newton  : 2000  MRN: [id number]  CSN: [id number]    History and Physical  Sari Newton is a 23 y.o. year old  with an Estimated Date of Delivery: 23 presenting for induction of labor for favorable cervix at term.  Patient was seen in the office and was 3 to 4 cm.  Her GBS is negative.  Her last ultrasound showed the infant at the 64th percentile for growth.  Her pelvis is proven to 8 pounds 4 ounces.    Risks of labor induction including prolongation of labor, increased risks for both  section and operative vaginal birth have been discussed at length.     Prenatal care has been with AMELIA Dunham.  It has been benign.  She has been treated for reflux as well as iron deficiency anemia.    History  OB History    Para Term  AB Living   4 2 2 0 1 2   SAB IAB Ectopic Molar Multiple Live Births   1 0 0 0 0 2      # Outcome Date GA Lbr Govind/2nd Weight Sex Delivery Anes PTL Lv   4 Current            3 Term 19 39w0d  3487 g (7 lb 11 oz) F Vag-Spont   MARGRET   2 Term 10/06/17 40w0d  3742 g (8 lb 4 oz) M Vag-Spont   MARGRET   1 SAB 10/01/15 11w5d            Past Medical History:   Diagnosis Date    Anemia     History of blood transfusion     Miscarriage     Postpartum depression     1st baby    Pregnancy     Urinary tract infection     \"kept one this pregnancy\"     No current facility-administered medications on file prior to encounter.     Current Outpatient Medications on File Prior to Encounter   Medication Sig Dispense Refill    famotidine (Pepcid) 20 MG tablet Take 1 tablet by mouth 2 (Two) Times a Day. 60 tablet 6    omeprazole (priLOSEC) 20 MG capsule Take 1 capsule by mouth Daily. 30 capsule 6    PRENATAL GUMMY VITAMIN Chew 1 each Daily.      docusate sodium (Colace) 100 MG capsule Take 1 capsule by mouth 2 (Two) Times a Day. (Patient not taking: Reported on 2023) 60 capsule 1    doxylamine (UNISOM) 25 MG tablet Take 1 tablet by mouth " Every Night. (Patient not taking: Reported on 7/17/2023) 30 tablet 5    metoclopramide (Reglan) 10 MG tablet Take 1 tablet by mouth 3 (Three) Times a Day Before Meals. (Patient not taking: Reported on 7/17/2023) 30 tablet 5    psyllium (Metamucil Smooth Texture) 58.6 % powder Take  by mouth Daily. (Patient not taking: Reported on 7/17/2023) 283 g 3    vitamin B-6 (PYRIDOXINE) 25 MG tablet Take 1 tablet by mouth Every Night. (Patient not taking: Reported on 7/17/2023) 30 tablet 5     No Known Allergies  Past Surgical History:   Procedure Laterality Date    COLONOSCOPY      ENDOSCOPY      OTHER SURGICAL HISTORY  2012    blood transfusion     Family History   Problem Relation Age of Onset    Cancer Mother     Diabetes Father      Social History     Socioeconomic History    Marital status: Single    Number of children: 2    Highest education level: 11th grade   Tobacco Use    Smoking status: Every Day     Packs/day: 0.50     Years: 3.00     Pack years: 1.50     Types: Cigarettes   Vaping Use    Vaping Use: Never used   Substance and Sexual Activity    Alcohol use: No    Drug use: No    Sexual activity: Yes     Partners: Male     Birth control/protection: None     Review of Systems  The following systems were reviewed and negative:  constitution, eyes, ENT, respiratory, cardiovascular, gastrointestinal, genitourinary, integument, breast, hematologic / lymphatic, musculoskeletal, neurological, behavioral/psych, endocrine, and allergies / immunologic    Objective  Vitals:    07/17/23 0716 07/17/23 0737 07/17/23 0748 07/17/23 0800   BP: 112/89 122/70 (!) 87/54 116/62   Pulse: 92 87 76 87   Resp:       Temp:       TempSrc:       SpO2:    99%   Weight:       Height:         Physical Exam:  General Appearance:  Alert and cooperative, not in any acute distress.   Head:  Atraumatic and normocephalic, without obvious abnormality.   Eyes:          PERRLA, conjunctivae and sclerae normal, no Icterus. No pallor. Extraocular  movements are within normal limits.   Ears:  Ears appear intact with no abnormalities noted.   Throat: No oral lesions, no thrush, oral mucosa moist.   Neck: Supple, trachea midline, no thyromegaly, no carotid bruit.   Back:   No kyphoscoliosis present. No tenderness to palpation,   range of motion normal.  No CVAT.   Lungs:   Chest shape is normal. Breath sounds heard bilaterally equally.  No crackles or wheezing. No Pleural rub or bronchial breathing. Normal respiratory effort.   Heart:  Normal S1 and S2, no murmur, no gallop, no rub. No JVD.   Abdomen:   Normal bowel sounds, no masses, no hepatomegaly, no splenomegaly. Soft, non-tender, no guarding, no rebound tenderness.  Gravid uterus.   Extremities: Moves all extremities well, no edema, no cyanosis, no clubbing.  Normal range of motion. Normal strength and tone.   Skin: No bleeding, bruising or rash. No palpable masses noted or induration.   Psychiatric: Alert and oriented  to time, place, and person. Appropriate mood and affect. Thought content organized and appropriate judgment.       FHT's: reassuring and category 1   Contractions: irregular   Cervix: was checked (by me): 3 cm / 80 % / -3; AROM clear fluid   Presentation: cephalic       Prenatal Labs  Lab Results   Component Value Date    HGB 11.6 (L) 07/17/2023    HEPBSAG Negative 12/22/2022    ABSCRN Negative 07/17/2023    [id number] Non Reactive 12/22/2022    HEPCVIRUSABY <0.1 12/22/2022    STREPGPB Negative 06/26/2023    URINECX No growth 06/22/2023       Current Labs Reviewed    I reviewed the patient's new clinical results.     Lab Results (last 24 hours)       Procedure Component Value Units Date/Time    CBC & Differential [[id number]]  (Abnormal) Collected: 07/17/23 0628    Specimen: Blood Updated: 07/17/23 0641    Narrative:      The following orders were created for panel order CBC & Differential.  Procedure                               Abnormality         Status                     ---------                                -----------         ------                     CBC Auto Differential[[id number]]        Abnormal            Final result                 Please view results for these tests on the individual orders.    CBC Auto Differential [[id number]]  (Abnormal) Collected: 23    Specimen: Blood Updated: 23     WBC 12.54 10*3/mm3      RBC 3.77 10*6/mm3      Hemoglobin 11.6 g/dL      Hematocrit 34.6 %      MCV 91.8 fL      MCH 30.8 pg      MCHC 33.5 g/dL      RDW 13.7 %      RDW-SD 46.0 fl      MPV 10.8 fL      Platelets 187 10*3/mm3      Neutrophil % 72.6 %      Lymphocyte % 16.6 %      Monocyte % 9.3 %      Eosinophil % 0.5 %      Basophil % 0.2 %      Immature Grans % 0.8 %      Neutrophils, Absolute 9.10 10*3/mm3      Lymphocytes, Absolute 2.08 10*3/mm3      Monocytes, Absolute 1.17 10*3/mm3      Eosinophils, Absolute 0.06 10*3/mm3      Basophils, Absolute 0.03 10*3/mm3      Immature Grans, Absolute 0.10 10*3/mm3      nRBC 0.0 /100 WBC             Assessment   IUP with an Estimated Date of Delivery: 23  Induction of labor because of favorable cervix at term  Group B strep status: negative  Anemia     Plan   Pitocin induction  Amniotomy as noted  Epidural when needed  Anticipate IRVIN Barth M.D.  2023  08:04 EDT

## 2023-07-17 NOTE — NON STRESS TEST
"  Sari Newton, a  at 39w1d with an OG of 2023, by Ultrasound, was seen at [facility] for a nonstress test.    Chief Complaint   Patient presents with    Scheduled Induction     I'm here \"to be induced because I feel like baby is bigger than what it is showing.\"       Patient Active Problem List   Diagnosis    Encounter for induction of labor       Start Time: 0608  Stop Time: 0700    Interpretation A  Nonstress Test Interpretation A: Reactive                   "

## 2023-07-17 NOTE — L&D DELIVERY NOTE
[facility]  Vaginal Delivery Note    Delivery details     Delivery: Vaginal, Spontaneous     YOB: 2023    Time of Birth: 12:12 PM      Anesthesia: Epidural     Delivering clinician: Delaney Barth    Forceps?   No   Vacuum? No    Shoulder dystocia present: No      Delivery narrative:  The patient progressed to complete and pushing.  With good maternal effort she delivered a viable male infant.  The head presented in the OP presentation.  There was  no nuchal cord present.  The anterior fetal shoulder was then easily delivered with a gentle downward motion followed by the posterior fetal shoulder, followed by the remainder of the infant without difficulty.  The infant was immediately vigorous.  The oropharynx and nares were bulb-suction.  The infant was placed on the maternal abdomen and the cord was clamped roughly 45 seconds following delivery.  Cord blood was then collected.  The placenta then delivered spontaneously intact, and a three vessel cord was noted.  Uterine massage and Pitocin 20 units IV was given until the fundus was firm.  The cervix, vagina, perineum, and rectum were carefully inspected for lacerations.  There were no lacerations noted.  The patient had mild uterine atony which responded to uterine massage, IV Pitocin, and IM Methergine.   Counts for needles, sponges, laps and instruments were correct at the end of the delivery.  There were no major complications.  Mother and baby were stable at the end of the delivery.    Infant details    Findings: male  infant     Infant observations: Weight: 3867 g (8 lb 8.4 oz)   Length: 22  in   Apgars: 8  @ 1 minute /    9  @ 5 minutes     Placenta, Cord, and Fluid    Placenta delivered  Spontaneous  at   7/17/2023 12:16 PM         Nuchal Cord?  no   Cord blood obtained: Yes      Repair    Episiotomy: None    Lacerations: No   Estimated Blood Loss:         Complications  none    Disposition  Mother to Remain in LD  in stable condition  currently.  Baby to remains with mom  in stable condition currently.      Delaney Barth MD  07/17/23  12:51 EDT

## 2023-07-18 VITALS
TEMPERATURE: 98 F | BODY MASS INDEX: 36.73 KG/M2 | HEIGHT: 67 IN | HEART RATE: 90 BPM | SYSTOLIC BLOOD PRESSURE: 118 MMHG | WEIGHT: 234 LBS | DIASTOLIC BLOOD PRESSURE: 71 MMHG | RESPIRATION RATE: 17 BRPM | OXYGEN SATURATION: 98 %

## 2023-07-18 LAB
BASOPHILS # BLD AUTO: 0.04 10*3/MM3 (ref 0–0.2)
BASOPHILS NFR BLD AUTO: 0.3 % (ref 0–1.5)
DEPRECATED RDW RBC AUTO: 46.8 FL (ref 37–54)
EOSINOPHIL # BLD AUTO: 0.06 10*3/MM3 (ref 0–0.4)
EOSINOPHIL NFR BLD AUTO: 0.4 % (ref 0.3–6.2)
ERYTHROCYTE [DISTWIDTH] IN BLOOD BY AUTOMATED COUNT: 13.8 % (ref 12.3–15.4)
HCT VFR BLD AUTO: 31.5 % (ref 34–46.6)
HGB BLD-MCNC: 10.3 G/DL (ref 12–15.9)
IMM GRANULOCYTES # BLD AUTO: 0.11 10*3/MM3 (ref 0–0.05)
IMM GRANULOCYTES NFR BLD AUTO: 0.8 % (ref 0–0.5)
LYMPHOCYTES # BLD AUTO: 2.72 10*3/MM3 (ref 0.7–3.1)
LYMPHOCYTES NFR BLD AUTO: 19.8 % (ref 19.6–45.3)
MCH RBC QN AUTO: 30.6 PG (ref 26.6–33)
MCHC RBC AUTO-ENTMCNC: 32.7 G/DL (ref 31.5–35.7)
MCV RBC AUTO: 93.5 FL (ref 79–97)
MONOCYTES # BLD AUTO: 1.33 10*3/MM3 (ref 0.1–0.9)
MONOCYTES NFR BLD AUTO: 9.7 % (ref 5–12)
NEUTROPHILS NFR BLD AUTO: 69 % (ref 42.7–76)
NEUTROPHILS NFR BLD AUTO: 9.45 10*3/MM3 (ref 1.7–7)
NRBC BLD AUTO-RTO: 0 /100 WBC (ref 0–0.2)
PLATELET # BLD AUTO: 177 10*3/MM3 (ref 140–450)
PMV BLD AUTO: 10.9 FL (ref 6–12)
RBC # BLD AUTO: 3.37 10*6/MM3 (ref 3.77–5.28)
WBC NRBC COR # BLD: 13.71 10*3/MM3 (ref 3.4–10.8)

## 2023-07-18 PROCEDURE — 85025 COMPLETE CBC W/AUTO DIFF WBC: CPT | Performed by: OBSTETRICS & GYNECOLOGY

## 2023-07-18 RX ORDER — FERROUS SULFATE TAB EC 324 MG (65 MG FE EQUIVALENT) 324 (65 FE) MG
324 TABLET DELAYED RESPONSE ORAL
Status: DISCONTINUED | OUTPATIENT
Start: 2023-07-18 | End: 2023-07-18 | Stop reason: HOSPADM

## 2023-07-18 RX ORDER — FERROUS SULFATE TAB EC 324 MG (65 MG FE EQUIVALENT) 324 (65 FE) MG
324 TABLET DELAYED RESPONSE ORAL
Qty: 30 TABLET | Refills: 1 | Status: SHIPPED | OUTPATIENT
Start: 2023-07-19

## 2023-07-18 RX ORDER — ACETAMINOPHEN 325 MG/1
650 TABLET ORAL EVERY 6 HOURS PRN
Qty: 30 TABLET | Refills: 0 | Status: SHIPPED | OUTPATIENT
Start: 2023-07-18

## 2023-07-18 RX ORDER — IBUPROFEN 600 MG/1
600 TABLET ORAL EVERY 6 HOURS PRN
Qty: 30 TABLET | Refills: 0 | Status: SHIPPED | OUTPATIENT
Start: 2023-07-18

## 2023-07-18 RX ORDER — DOCUSATE SODIUM 100 MG/1
100 CAPSULE, LIQUID FILLED ORAL 2 TIMES DAILY PRN
Qty: 60 CAPSULE | Refills: 1 | Status: SHIPPED | OUTPATIENT
Start: 2023-07-18

## 2023-07-18 RX ADMIN — DOCUSATE SODIUM 100 MG: 100 CAPSULE, LIQUID FILLED ORAL at 08:35

## 2023-07-18 RX ADMIN — Medication: at 08:35

## 2023-07-18 RX ADMIN — PRENATAL VITAMINS-IRON FUMARATE 27 MG IRON-FOLIC ACID 0.8 MG TABLET 1 TABLET: at 08:35

## 2023-07-18 RX ADMIN — HYDROCODONE BITARTRATE AND ACETAMINOPHEN 1 TABLET: 5; 325 TABLET ORAL at 00:23

## 2023-07-18 RX ADMIN — FERROUS SULFATE TAB EC 324 MG (65 MG FE EQUIVALENT) 324 MG: 324 (65 FE) TABLET DELAYED RESPONSE at 09:50

## 2023-07-18 NOTE — PROGRESS NOTES
" Roly  Vaginal Delivery Progress Note    Subjective   Subjective  Postpartum Day 1: Vaginal Delivery    The patient feels well.  Her pain is well controlled. She is ambulating well.  Patient describes her bleeding as thin lochia. Voiding without difficulty    Breastfeeding: declines.    Objective     Objective   Vital Signs Range for the last 24 hours  Temperature: Temp:  [97.9 °F (36.6 °C)-98.6 °F (37 °C)] 97.9 °F (36.6 °C)   Temp Source: Temp src: Oral   BP: BP: ()/() 108/67   Pulse: Heart Rate:  [] 68   Respirations: Resp:  [16-19] 16   SPO2: SpO2:  [88 %-100 %] 98 %   O2 Amount (l/min):     O2 Devices Device (Oxygen Therapy): room air   Weight:       Admit Height:  Height: 170.2 cm (67\")    Physical Exam:  General:  no acute distresss.  Abdomen: Fundus: appropriate, firm, non tender  Extremities: normal, atraumatic, no cyanosis, and trace edema.       Lab results reviewed:  Yes Hgb 10.3 Hct 31.5  Rubella:  No results found for: RUBELLAIGGIN Nurse Transcribed from prenatal record --    Rubella Antibodies, IgG   Date Value Ref Range Status   12/22/2022 5.25 Immune >0.99 index Final     Comment:                                     Non-immune       <0.90                                  Equivocal  0.90 - 0.99                                  Immune           >0.99       Rh Status:    RH type   Date Value Ref Range Status   07/17/2023 Positive  Final   07/17/2023 Positive  Final     Rh Factor   Date Value Ref Range Status   12/22/2022 Positive  Final     Comment:     Please note: Prior records for this patient's ABO / Rh type are not  available for additional verification.       Immunizations: There is no immunization history for the selected administration types on file for this patient.    Assessment & Plan   Assessment & Plan    Encounter for induction of labor      Sari Rubi Newton is Day 1  post-partum    Vaginal, Spontaneous  , Anemia         Plan:  Continue current care. Iron " supplementation. Possible discharge this afternoon       Kaycee Abdi PA-C  7/18/2023  08:23 EDT

## 2023-07-18 NOTE — PLAN OF CARE
Goal Outcome Evaluation:  Plan of Care Reviewed With: patient        Progress: improving  Outcome Evaluation: vss, ambulating in room and hallway, lochia WNL, bonding with infant, voiding without diff, pain controlled with oral pain medication.

## 2023-07-18 NOTE — PLAN OF CARE
Problem: Adult Inpatient Plan of Care  Goal: Plan of Care Review  Outcome: Met  Flowsheets (Taken 7/18/2023 1155)  Outcome Evaluation: VSS, adequate I/O, patient ambulating independently, bleeding WDL, positive maternal infant bonding observed, pain managed with current pain regimen  Goal: Patient-Specific Goal (Individualized)  Outcome: Met  Goal: Absence of Hospital-Acquired Illness or Injury  Outcome: Met  Intervention: Identify and Manage Fall Risk  Recent Flowsheet Documentation  Taken 7/18/2023 1000 by Debbie Rao RN  Safety Promotion/Fall Prevention:   safety round/check completed   room organization consistent   nonskid shoes/slippers when out of bed   clutter free environment maintained   assistive device/personal items within reach   activity supervised   fall prevention program maintained  Taken 7/18/2023 0800 by Debbie Rao RN  Safety Promotion/Fall Prevention:   safety round/check completed   room organization consistent   nonskid shoes/slippers when out of bed   fall prevention program maintained   clutter free environment maintained   assistive device/personal items within reach   activity supervised  Intervention: Prevent and Manage VTE (Venous Thromboembolism) Risk  Recent Flowsheet Documentation  Taken 7/18/2023 0800 by Debbie Rao, RN  Range of Motion: active ROM (range of motion) encouraged  Goal: Optimal Comfort and Wellbeing  Outcome: Met  Intervention: Monitor Pain and Promote Comfort  Recent Flowsheet Documentation  Taken 7/18/2023 0800 by Debbie Rao RN  Pain Management Interventions: no interventions per patient request  Goal: Readiness for Transition of Care  Outcome: Met     Problem: Breastfeeding  Goal: Effective Breastfeeding  Outcome: Met     Problem: Adjustment to Role Transition (Postpartum Vaginal Delivery)  Goal: Successful Maternal Role Transition  Outcome: Met     Problem: Bleeding (Postpartum Vaginal Delivery)  Goal: Hemostasis  Outcome: Met     Problem: Infection  (Postpartum Vaginal Delivery)  Goal: Absence of Infection Signs/Symptoms  Outcome: Met     Problem: Pain (Postpartum Vaginal Delivery)  Goal: Acceptable Pain Control  Outcome: Met  Intervention: Prevent or Manage Pain  Recent Flowsheet Documentation  Taken 7/18/2023 0800 by Debbie Rao RN  Pain Management Interventions: no interventions per patient request     Problem: Urinary Retention (Postpartum Vaginal Delivery)  Goal: Effective Urinary Elimination  Outcome: Met   Goal Outcome Evaluation:              Outcome Evaluation: VSS, adequate I/O, patient ambulating independently, bleeding WDL, positive maternal infant bonding observed, pain managed with current pain regimen

## 2023-07-18 NOTE — PAYOR COMM NOTE
"To:  [facility]  From: Rosalie Schwartz RN  Phone: [phone]  Fax: [phone]  NPI: [id number]  TIN: [id number]  Member ID: [id number]   MRN: [id number]     EDC 23@ 39 1/7 WEEKS  DELIVERED 23@ 12:12P.M.  VAGINAL MALE 8#8.4OZ.; 3867GMS; APGAR 8/9    Landon Newton (23 y.o. Female)       Date of Birth   2000    Social Security Number       Address   [address] [address]    Home Phone   [phone]    MRN   [id number]       Baptism   None    Marital Status   Single                            Admission Date   23    Admission Type   Elective    Admitting Provider   Samuel Aaron MD    Attending Provider   Delaney Barth MD    Department, Room/Bed   [facility], W202/       Discharge Date       Discharge Disposition       Discharge Destination                                 Attending Provider: Delaney Barth MD    Allergies: No Known Allergies    Isolation: None   Infection: None   Code Status: CPR    Ht: 170.2 cm (67\")   Wt: 106 kg (234 lb)    Admission Cmt: None   Principal Problem: Encounter for induction of labor [Z34.90]                   Active Insurance as of 2023       Primary Coverage       Payor Plan Insurance Group Employer/Plan Group    [employer] [place] [employer]        Payor Plan Address Payor Plan Phone Number Payor Plan Fax Number Effective Dates    [address] [address] [phone]  2017 - None Entered    [place] [address]         Subscriber Name Subscriber Birth Date Member ID       ERNESTINALANDON NG 2000 [id number]                     Emergency Contacts        (Rel.) Home Phone Work Phone Mobile Phone    Niesha Newton (Sister) -- -- [phone]    ErnestinaBrinda (Mother) [phone] -- --                 History & Physical        Delaney Barth MD at 23 0804          [facility]  Landon Elicia Newton  : 2000  MRN: [id number]  CSN: [id number]    History and Physical  Landon Newton is " "a 23 y.o. year old  with an Estimated Date of Delivery: 23 presenting for induction of labor for favorable cervix at term.  Patient was seen in the office and was 3 to 4 cm.  Her GBS is negative.  Her last ultrasound showed the infant at the 64th percentile for growth.  Her pelvis is proven to 8 pounds 4 ounces.    Risks of labor induction including prolongation of labor, increased risks for both  section and operative vaginal birth have been discussed at length.     Prenatal care has been with [facility].  It has been benign.  She has been treated for reflux as well as iron deficiency anemia.    History  OB History    Para Term  AB Living   4 2 2 0 1 2   SAB IAB Ectopic Molar Multiple Live Births   1 0 0 0 0 2      # Outcome Date GA Lbr Govind/2nd Weight Sex Delivery Anes PTL Lv   4 Current            3 Term 19 39w0d  3487 g (7 lb 11 oz) F Vag-Spont   MARGRET   2 Term 10/06/17 40w0d  3742 g (8 lb 4 oz) M Vag-Spont   MARGRET   1 SAB 10/01/15 11w5d            Past Medical History:   Diagnosis Date    Anemia     History of blood transfusion 2012    Miscarriage     Postpartum depression     1st baby    Pregnancy     Urinary tract infection     \"kept one this pregnancy\"     No current facility-administered medications on file prior to encounter.     Current Outpatient Medications on File Prior to Encounter   Medication Sig Dispense Refill    famotidine (Pepcid) 20 MG tablet Take 1 tablet by mouth 2 (Two) Times a Day. 60 tablet 6    omeprazole (priLOSEC) 20 MG capsule Take 1 capsule by mouth Daily. 30 capsule 6    PRENATAL GUMMY VITAMIN Chew 1 each Daily.      docusate sodium (Colace) 100 MG capsule Take 1 capsule by mouth 2 (Two) Times a Day. (Patient not taking: Reported on 2023) 60 capsule 1    doxylamine (UNISOM) 25 MG tablet Take 1 tablet by mouth Every Night. (Patient not taking: Reported on 2023) 30 tablet 5    metoclopramide (Reglan) 10 MG tablet Take 1 tablet by " mouth 3 (Three) Times a Day Before Meals. (Patient not taking: Reported on 7/17/2023) 30 tablet 5    psyllium (Metamucil Smooth Texture) 58.6 % powder Take  by mouth Daily. (Patient not taking: Reported on 7/17/2023) 283 g 3    vitamin B-6 (PYRIDOXINE) 25 MG tablet Take 1 tablet by mouth Every Night. (Patient not taking: Reported on 7/17/2023) 30 tablet 5     No Known Allergies  Past Surgical History:   Procedure Laterality Date    COLONOSCOPY      ENDOSCOPY      OTHER SURGICAL HISTORY  2012    blood transfusion     Family History   Problem Relation Age of Onset    Cancer Mother     Diabetes Father      Social History     Socioeconomic History    Marital status: Single    Number of children: 2    Highest education level: 11th grade   Tobacco Use    Smoking status: Every Day     Packs/day: 0.50     Years: 3.00     Pack years: 1.50     Types: Cigarettes   Vaping Use    Vaping Use: Never used   Substance and Sexual Activity    Alcohol use: No    Drug use: No    Sexual activity: Yes     Partners: Male     Birth control/protection: None     Review of Systems  The following systems were reviewed and negative:  constitution, eyes, ENT, respiratory, cardiovascular, gastrointestinal, genitourinary, integument, breast, hematologic / lymphatic, musculoskeletal, neurological, behavioral/psych, endocrine, and allergies / immunologic    Objective  Vitals:    07/17/23 0716 07/17/23 0737 07/17/23 0748 07/17/23 0800   BP: 112/89 122/70 (!) 87/54 116/62   Pulse: 92 87 76 87   Resp:       Temp:       TempSrc:       SpO2:    99%   Weight:       Height:         Physical Exam:  General Appearance:  Alert and cooperative, not in any acute distress.   Head:  Atraumatic and normocephalic, without obvious abnormality.   Eyes:          PERRLA, conjunctivae and sclerae normal, no Icterus. No pallor. Extraocular movements are within normal limits.   Ears:  Ears appear intact with no abnormalities noted.   Throat: No oral lesions, no thrush,  oral mucosa moist.   Neck: Supple, trachea midline, no thyromegaly, no carotid bruit.   Back:   No kyphoscoliosis present. No tenderness to palpation,   range of motion normal.  No CVAT.   Lungs:   Chest shape is normal. Breath sounds heard bilaterally equally.  No crackles or wheezing. No Pleural rub or bronchial breathing. Normal respiratory effort.   Heart:  Normal S1 and S2, no murmur, no gallop, no rub. No JVD.   Abdomen:   Normal bowel sounds, no masses, no hepatomegaly, no splenomegaly. Soft, non-tender, no guarding, no rebound tenderness.  Gravid uterus.   Extremities: Moves all extremities well, no edema, no cyanosis, no clubbing.  Normal range of motion. Normal strength and tone.   Skin: No bleeding, bruising or rash. No palpable masses noted or induration.   Psychiatric: Alert and oriented  to time, place, and person. Appropriate mood and affect. Thought content organized and appropriate judgment.       FHT's: reassuring and category 1   Contractions: irregular   Cervix: was checked (by me): 3 cm / 80 % / -3; AROM clear fluid   Presentation: cephalic       Prenatal Labs  Lab Results   Component Value Date    HGB 11.6 (L) 07/17/2023    HEPBSAG Negative 12/22/2022    ABSCRN Negative 07/17/2023    AAC1UVI3 Non Reactive 12/22/2022    HEPCVIRUSABY <0.1 12/22/2022    STREPGPB Negative 06/26/2023    URINECX No growth 06/22/2023       Current Labs Reviewed    I reviewed the patient's new clinical results.     Lab Results (last 24 hours)       Procedure Component Value Units Date/Time    CBC & Differential [[id number]]  (Abnormal) Collected: 07/17/23 0628    Specimen: Blood Updated: 07/17/23 0641    Narrative:      The following orders were created for panel order CBC & Differential.  Procedure                               Abnormality         Status                     ---------                               -----------         ------                     CBC Auto Differential[[id number]]        Abnormal             Final result                 Please view results for these tests on the individual orders.    CBC Auto Differential [[id number]]  (Abnormal) Collected: 23    Specimen: Blood Updated: 23     WBC 12.54 10*3/mm3      RBC 3.77 10*6/mm3      Hemoglobin 11.6 g/dL      Hematocrit 34.6 %      MCV 91.8 fL      MCH 30.8 pg      MCHC 33.5 g/dL      RDW 13.7 %      RDW-SD 46.0 fl      MPV 10.8 fL      Platelets 187 10*3/mm3      Neutrophil % 72.6 %      Lymphocyte % 16.6 %      Monocyte % 9.3 %      Eosinophil % 0.5 %      Basophil % 0.2 %      Immature Grans % 0.8 %      Neutrophils, Absolute 9.10 10*3/mm3      Lymphocytes, Absolute 2.08 10*3/mm3      Monocytes, Absolute 1.17 10*3/mm3      Eosinophils, Absolute 0.06 10*3/mm3      Basophils, Absolute 0.03 10*3/mm3      Immature Grans, Absolute 0.10 10*3/mm3      nRBC 0.0 /100 WBC             Assessment  IUP with an Estimated Date of Delivery: 23  Induction of labor because of favorable cervix at term  Group B strep status: negative  Anemia     Plan  Pitocin induction  Amniotomy as noted  Epidural when needed  Anticipate     Delaney Barth M.D.  2023  08:04 EDT        Electronically signed by Delaney Barth MD at 23 0808       Vital Signs (last day)       Date/Time Temp Temp src Pulse Resp BP Patient Position SpO2    23 0600 97.9 (36.6) Oral 68 16 108/67 -- 98    23 0136 98.2 (36.8) Oral 69 16 104/63 Lying 95    23 1736 98.1 (36.7) Oral 73 17 97/53 Lying 98    23 1656 98 (36.7) Oral 79 17 108/68 Lying 96    23 1556 98.3 (36.8) Oral 85 17 110/73 Lying --    23 1456 98.6 (37) Oral 91 19 120/81 Lying 98    23 1330 -- -- 70 -- 128/66 -- 98    23 1315 -- -- 81 -- 113/69 -- 95    23 1303 -- -- 80 -- 132/59 -- 97    23 1245 -- -- 89 -- 143/75 -- 97    23 1230 -- -- 102 -- 132/70 -- 99    23 1215 -- -- 125 -- 147/80 -- 100    23 1200 -- -- 105 -- 120/73 -- 100    23  1145 -- -- 98 -- 141/77 -- 100    07/17/23 1130 -- -- 76 -- 123/67 -- 100    07/17/23 1115 -- -- 63 -- 127/73 -- 98    07/17/23 1100 -- -- 99 -- 108/62 -- 99    07/17/23 1045 -- -- 79 -- 126/44 -- 100    07/17/23 1030 -- -- 83 -- 128/72 -- 99    07/17/23 1015 -- -- 74 -- 127/67 -- 97    07/17/23 1000 -- -- 100 -- 125/75 -- 99    07/17/23 0945 -- -- 117 -- 115/85 -- 100    07/17/23 0930 -- -- 72 -- 117/74 -- 100    07/17/23 0915 -- -- 87 -- 131/72 -- 100    07/17/23 0900 98.1 (36.7) Oral 76 16 125/74 Lying 99    07/17/23 0857 -- -- 77 -- 121/59 -- --    07/17/23 0855 -- -- 92 -- 119/64 -- --    07/17/23 0853 -- -- 81 -- 129/69 -- --    07/17/23 0851 -- -- 102 -- 141/99 -- --    07/17/23 0847 -- -- 80 -- 139/86 -- --    07/17/23 0845 -- -- 123 -- 131/85 -- 88    07/17/23 0843 -- -- 113 -- 148/116 -- --    07/17/23 0842 -- -- 113 -- 148/116 -- --    07/17/23 0841 -- -- 109 -- 137/94 -- --    07/17/23 0840 -- -- 95 -- -- -- 99    07/17/23 0839 -- -- 122 -- 126/88 -- --    07/17/23 0830 -- -- 89 -- 115/79 -- 99    07/17/23 0815 -- -- 83 -- 111/66 -- 100    07/17/23 0800 -- -- 87 -- 116/62 -- 99    07/17/23 0748 -- -- 76 -- 87/54 -- --    07/17/23 0737 -- -- 87 -- 122/70 -- --    07/17/23 0716 -- -- 92 -- 112/89 -- --    07/17/23 0700 98.2 (36.8) Oral 107 18 123/81 -- 98    07/17/23 0613 -- -- -- -- -- Lying --          Current Facility-Administered Medications   Medication Dose Route Frequency Provider Last Rate Last Admin    acetaminophen (TYLENOL) tablet 650 mg  650 mg Oral Q6H PRN Delaney Barth MD   650 mg at 07/17/23 1941    benzocaine-menthol (DERMOPLAST) 20-0.5 % topical spray   Topical PRN Delaney Barth MD        bisacodyl (DULCOLAX) suppository 10 mg  10 mg Rectal Daily PRN Delaney Barth MD        diphenhydrAMINE (BENADRYL) capsule 25 mg  25 mg Oral Nightly PRN Delaney Barth MD        docusate sodium (COLACE) capsule 100 mg  100 mg Oral BID PRN Delaney Barth MD        HYDROcodone-acetaminophen (NORCO) 5-325 MG  per tablet 1 tablet  1 tablet Oral Q4H PRN Delaney Barth MD   1 tablet at 07/18/23 0023    Or    HYDROcodone-acetaminophen (NORCO)  MG per tablet 1 tablet  1 tablet Oral Q4H PRN Delaney Barth MD        Hydrocortisone (Perianal) (ANUSOL-HC) 2.5 % rectal cream   Rectal PRN Delaney Barth MD        ibuprofen (ADVIL,MOTRIN) tablet 600 mg  600 mg Oral Q6H PRN Delaney Barth MD        lanolin topical 1 application   1 application  Topical Q1H PRN Delaney Barth MD        magnesium hydroxide (MILK OF MAGNESIA) suspension 10 mL  10 mL Oral Daily PRN Delaney Barth MD        Measles, Mumps & Rubella Vac (MMR) injection 0.5 mL  0.5 mL Subcutaneous During Hospitalization Delaney Barth MD        ondansetron (ZOFRAN) injection 4 mg  4 mg Intravenous Q6H PRN Delaney Barth MD        ondansetron (ZOFRAN) tablet 4 mg  4 mg Oral Q8H PRN Delaney Barth MD        pramoxine-hydrocortisone 1-1 % foam 1 application   1 application  Topical PRN Delaney Barth MD        prenatal vitamin tablet 1 tablet  1 tablet Oral Daily Delaney Barth MD        promethazine (PHENERGAN) tablet 25 mg  25 mg Oral Q6H PRN Delaney Barth MD        Or    promethazine (PHENERGAN) suppository 12.5 mg  12.5 mg Rectal Q6H PRN Delaney Barth MD        sodium chloride 0.9 % flush 1-10 mL  1-10 mL Intravenous PRN Delaney Barth MD         Facility-Administered Medications Ordered in Other Encounters   Medication Dose Route Frequency Provider Last Rate Last Admin    fentaNYL citrate (PF) (SUBLIMAZE) injection   Epidural PRN Karel Minor CRNA   50 mcg at 07/17/23 0850     Lab Results (last 24 hours)       Procedure Component Value Units Date/Time    CBC & Differential [[id number]]  (Abnormal) Collected: 07/18/23 0545    Specimen: Blood Updated: 07/18/23 0629    Narrative:      The following orders were created for panel order CBC & Differential.  Procedure                               Abnormality         Status                     ---------                                -----------         ------                     CBC Auto Differential[[id number]]        Abnormal            Final result                 Please view results for these tests on the individual orders.    CBC Auto Differential [[id number]]  (Abnormal) Collected: 07/18/23 0545    Specimen: Blood Updated: 07/18/23 0629     WBC 13.71 10*3/mm3      RBC 3.37 10*6/mm3      Hemoglobin 10.3 g/dL      Hematocrit 31.5 %      MCV 93.5 fL      MCH 30.6 pg      MCHC 32.7 g/dL      RDW 13.8 %      RDW-SD 46.8 fl      MPV 10.9 fL      Platelets 177 10*3/mm3      Neutrophil % 69.0 %      Lymphocyte % 19.8 %      Monocyte % 9.7 %      Eosinophil % 0.4 %      Basophil % 0.3 %      Immature Grans % 0.8 %      Neutrophils, Absolute 9.45 10*3/mm3      Lymphocytes, Absolute 2.72 10*3/mm3      Monocytes, Absolute 1.33 10*3/mm3      Eosinophils, Absolute 0.06 10*3/mm3      Basophils, Absolute 0.04 10*3/mm3      Immature Grans, Absolute 0.11 10*3/mm3      nRBC 0.0 /100 WBC           Imaging Results (Last 24 Hours)       ** No results found for the last 24 hours. **          Orders (last 24 hrs)        Start     Ordered    07/18/23 0800  Sitz Bath  3 Times Daily        Comments: PRN    07/17/23 1601    07/18/23 0600  CBC & Differential  Timed        Comments: Postpartum Day 1      07/17/23 1601    07/18/23 0600  CBC Auto Differential  PROCEDURE ONCE        Comments: Postpartum Day 1      07/17/23 2204    07/18/23 0000  bisacodyl (DULCOLAX) suppository 10 mg  Daily PRN         07/17/23 1601    07/17/23 1700  prenatal vitamin tablet 1 tablet  Daily         07/17/23 1601    07/17/23 1602  Code Status and Medical Interventions:  Continuous         07/17/23 1601    07/17/23 1602  Vital Signs Per Hospital Policy  Per Hospital Policy         07/17/23 1601    07/17/23 1602  Notify Physician  Until Discontinued         07/17/23 1601    07/17/23 1602  Up Ad Nela  Until Discontinued         07/17/23 1601    07/17/23 1602  Ambulate Patient  Every Shift        07/17/23 1601    07/17/23 1602  Patient May Shower  Once        Comments: If patient steady on feet.    07/17/23 1601    07/17/23 1602  Advance Diet As Tolerated -Regular  Until Discontinued         07/17/23 1601    07/17/23 1602  Strict Intake and Output  Every Shift       07/17/23 1601    07/17/23 1602  Fundal and Lochia Check  Per Hospital Policy        Comments: Q 15 min x 4, Q 30 min x 2, then Q Shift    07/17/23 1601    07/17/23 1602  RN to Assess Rh Status & Place RhIG Evaluation Order if Indicated  Continuous         07/17/23 1601    07/17/23 1602  Bladder Assessment  Per Order Details        Comments: Postpartum 1) Upon Admission to Unit & Every 4 Hours PRN Until Voiding. 2) Out of Bed to Void in 8 Hours.    07/17/23 1601    07/17/23 1602  Straight Cath  Per Order Details        Comments: Postpartum: If Distended & Unable to Void, May Repeat Once.    07/17/23 1601    07/17/23 1602  Indwelling Urinary Catheter  Per Order Details        Comments: Postpartum : After Straight Cathed x2 or if Greater Than 1000mL Residual, Insert Indwelling Urinary Catheter Until Further MD Order.    07/17/23 1601    07/17/23 1602  Breast pump to bed  Once        Comments: Breast pump to bed if breastfeeding.    07/17/23 1601    07/17/23 1602  If indicated -- Please administer RH Immunoglobulin based on results of cord blood evaluation and fetal screen lab tests, pharmacy to dispense  Per Order Details        Comments: See Process Instructions For Reference Range Details.    07/17/23 1601    07/17/23 1601  HYDROcodone-acetaminophen (NORCO) 5-325 MG per tablet 1 tablet  Every 4 Hours PRN        See Hyperspace for full Linked Orders Report.    07/17/23 1601    07/17/23 1601  HYDROcodone-acetaminophen (NORCO)  MG per tablet 1 tablet  Every 4 Hours PRN        See Hyperspace for full Linked Orders Report.    07/17/23 1601    07/17/23 1601  promethazine (PHENERGAN) tablet 25 mg  Every 6 Hours PRN        See Hyperspace for  full Linked Orders Report.    07/17/23 1601    07/17/23 1601  promethazine (PHENERGAN) suppository 12.5 mg  Every 6 Hours PRN        See Hyperspace for full Linked Orders Report.    07/17/23 1601    07/17/23 1601  Measles, Mumps & Rubella Vac (MMR) injection 0.5 mL  During Hospitalization         07/17/23 1601    07/17/23 1601  sodium chloride 0.9 % flush 1-10 mL  As Needed         07/17/23 1601    07/17/23 1601  ibuprofen (ADVIL,MOTRIN) tablet 600 mg  Every 6 Hours PRN         07/17/23 1601    07/17/23 1601  acetaminophen (TYLENOL) tablet 650 mg  Every 6 Hours PRN         07/17/23 1601    07/17/23 1601  diphenhydrAMINE (BENADRYL) capsule 25 mg  Nightly PRN         07/17/23 1601    07/17/23 1601  magnesium hydroxide (MILK OF MAGNESIA) suspension 10 mL  Daily PRN         07/17/23 1601    07/17/23 1601  docusate sodium (COLACE) capsule 100 mg  2 Times Daily PRN         07/17/23 1601    07/17/23 1601  lanolin topical 1 application   Every 1 Hour PRN         07/17/23 1601    07/17/23 1601  benzocaine-menthol (DERMOPLAST) 20-0.5 % topical spray  As Needed         07/17/23 1601    07/17/23 1601  pramoxine-hydrocortisone 1-1 % foam 1 application   As Needed         07/17/23 1601    07/17/23 1601  Hydrocortisone (Perianal) (ANUSOL-HC) 2.5 % rectal cream  As Needed         07/17/23 1601    07/17/23 1601  ondansetron (ZOFRAN) tablet 4 mg  Every 8 Hours PRN         07/17/23 1601    07/17/23 1601  ondansetron (ZOFRAN) injection 4 mg  Every 6 Hours PRN         07/17/23 1601    07/17/23 1330  oxytocin (PITOCIN) 30 units in 0.9% sodium chloride 500 mL (premix)  Continuous        See Hyperspace for full Linked Orders Report.    07/17/23 1216    07/17/23 1315  oxytocin (PITOCIN) 30 units in 0.9% sodium chloride 500 mL (premix)  Once        See Hyperspace for full Linked Orders Report.    07/17/23 1216    07/17/23 1255  VTE Prophylaxis Not Indicated: No Risk Factors (0); </= 3 (Low Risk)  Once         07/17/23 1255    07/17/23 1254   Transfer Patient  Once         07/17/23 1255    07/17/23 1217  Nurse May Remove Epidural Catheter After Delivery  Continuous,   Status:  Canceled         07/17/23 1216    07/17/23 1217  Transfer to Postpartum When Criteria Met  Continuous,   Status:  Canceled         07/17/23 1216    07/17/23 1217  Notify Physician (specified)  Until Discontinued,   Status:  Canceled         07/17/23 1216    07/17/23 1217  Vital Signs Per Hospital Policy  Per Hospital Policy,   Status:  Canceled         07/17/23 1216    07/17/23 1217  Bed Rest with Bathroom Privileges  Once,   Status:  Canceled        Comments: Up to bathroom with assistance.    07/17/23 1216    07/17/23 1217  Fundal & Lochia Check  Per Order Details,   Status:  Canceled        Comments: Every 15 Minutes x4, Then Every 30 Minutes x2, Then Every Shift    07/17/23 1216    07/17/23 1217  Diet: Regular/House Diet; Texture: Regular Texture (IDDSI 7); Fluid Consistency: Thin (IDDSI 0)  Diet Effective Now         07/17/23 1216    07/17/23 1217  Advance Diet As Tolerated -  Until Discontinued,   Status:  Canceled         07/17/23 1216    07/17/23 1216  Fundal & Lochia Check  Every Shift,   Status:  Canceled       07/17/23 1216    07/17/23 1216  Apply Ice to Perineum  As Needed,   Status:  Canceled       07/17/23 1216    07/17/23 1216  Bladder Assessment  As Needed,   Status:  Canceled       07/17/23 1216    07/17/23 1216  acetaminophen (TYLENOL) tablet 650 mg  Once As Needed,   Status:  Discontinued         07/17/23 1216    07/17/23 1216  Morphine sulfate (PF) injection 2 mg  Once As Needed,   Status:  Discontinued         07/17/23 1216    07/17/23 1216  Morphine sulfate (PF) injection 4 mg  Once As Needed,   Status:  Discontinued         07/17/23 1216    07/17/23 1216  methylergonovine (METHERGINE) injection 200 mcg  Once As Needed         07/17/23 1216    07/17/23 1216  carboprost (HEMABATE) injection 250 mcg  Once As Needed,   Status:  Discontinued         07/17/23 1216     07/17/23 1216  miSOPROStol (CYTOTEC) tablet 800 mcg  As Needed,   Status:  Discontinued         07/17/23 1216    07/17/23 1216  ondansetron (ZOFRAN) tablet 4 mg  Once As Needed,   Status:  Discontinued        See Hyperspace for full Linked Orders Report.    07/17/23 1216    07/17/23 1216  ondansetron (ZOFRAN) injection 4 mg  Once As Needed,   Status:  Discontinued        See Hyperspace for full Linked Orders Report.    07/17/23 1216    07/17/23 1216  promethazine (PHENERGAN) tablet 12.5 mg  Every 6 Hours PRN,   Status:  Discontinued         07/17/23 1216    07/17/23 1131  mineral oil liquid 30 mL  Daily PRN,   Status:  Discontinued         07/17/23 1131    07/17/23 0900  sodium chloride 0.9 % flush 10 mL  Every 12 Hours Scheduled,   Status:  Discontinued         07/17/23 0609    07/17/23 0833  fentaNYL citrate (PF) (SUBLIMAZE) 50 mcg/mL injection  - ADS Override Pull        Note to Pharmacy: Created by cabinet override    07/17/23 0833    07/17/23 0815  lactated ringers bolus 1,000 mL  Once         07/17/23 0716    07/17/23 0815  fentaNYL 2mcg/mL and ropivacaine 0.2% in NS epidural 100 mL  Continuous,   Status:  Discontinued         07/17/23 0716    07/17/23 0717  Vital Signs Per Anesthesia Guidelines  Per Order Details,   Status:  Canceled        Comments: Every 2 Minutes x5, Every 5 Minutes x4, Then If Stable Every 15 Minutes    07/17/23 0716    07/17/23 0717  Start IV (16 or 18 Gauge)  Once,   Status:  Canceled         07/17/23 0716    07/17/23 0717  Fetal Heart Rate Monitor  Once,   Status:  Canceled         07/17/23 0716    07/17/23 0717  Nurse or Anesthesiologist to Remain With Patient for 15 Minutes Following Dosing  Continuous,   Status:  Canceled         07/17/23 0716    07/17/23 0717  Facilitate Maternal Position on Side & Maintain Uterine Displacement  Continuous,   Status:  Canceled         07/17/23 0716    07/17/23 0717  Consult Anesthesia Prior to Changing Epidural Infusion / Rate  Continuous,    Status:  Canceled         07/17/23 0716    07/17/23 0717  Notify Provider  Until Discontinued,   Status:  Canceled         07/17/23 0716    07/17/23 0716  ePHEDrine Sulfate (Pressors) 5 MG/ML injection 10 mg  Every 10 Minutes PRN,   Status:  Discontinued         07/17/23 0716    07/17/23 0700  lactated ringers infusion  Continuous,   Status:  Discontinued         07/17/23 0609    07/17/23 0700  oxytocin (PITOCIN) 30 units in 0.9% sodium chloride 500 mL (premix)  Titrated,   Status:  Discontinued         07/17/23 0609    07/17/23 0609  Position change  As Needed,   Status:  Canceled      Comments: For intra-uterine resuscitation for hypertonus, hypertstimulation, or non-reassuring fetal status    07/17/23 0609    07/17/23 0609  lidocaine PF 1% (XYLOCAINE) injection 5 mL  As Needed,   Status:  Discontinued         07/17/23 0609    07/17/23 0609  sodium chloride 0.9 % flush 1-10 mL  As Needed,   Status:  Discontinued         07/17/23 0609    07/17/23 0609  acetaminophen (TYLENOL) tablet 650 mg  Every 4 Hours PRN,   Status:  Discontinued         07/17/23 0609    07/17/23 0609  Morphine sulfate (PF) injection 4 mg  Every 4 Hours PRN,   Status:  Discontinued         07/17/23 0609    07/17/23 0609  Morphine sulfate (PF) injection 6 mg  Every 2 Hours PRN,   Status:  Discontinued         07/17/23 0609    07/17/23 0609  ondansetron (ZOFRAN) tablet 4 mg  Every 6 Hours PRN,   Status:  Discontinued        See Hyperspace for full Linked Orders Report.    07/17/23 0609    07/17/23 0609  ondansetron (ZOFRAN) injection 4 mg  Every 6 Hours PRN,   Status:  Discontinued        See Hyperspace for full Linked Orders Report.    07/17/23 0609    07/17/23 0609  promethazine (PHENERGAN) suppository 12.5 mg  Every 6 Hours PRN,   Status:  Discontinued        See Hyperspace for full Linked Orders Report.    07/17/23 0609    07/17/23 0609  promethazine (PHENERGAN) tablet 12.5 mg  Every 6 Hours PRN,   Status:  Discontinued        See Hyperspace  for full Linked Orders Report.    07/17/23 0609    Unscheduled  Apply Ice to Perineum  As Needed      Comments: For 20 min q 2 hrs    07/17/23 1601    Unscheduled  Waffle Cushion  As Needed      Comments: For perineal discomfort    07/17/23 1601    Unscheduled  Donut Ring  As Needed      Comments: For perineal pain    07/17/23 1601    Unscheduled  Kpad  As Needed      Comments: For pain    07/17/23 1601    Unscheduled  Apply witch hazel pads / TUCKS to perineum as needed for comfort PRN  As Needed       07/17/23 1601    Unscheduled  Warm compress  As Needed       07/17/23 1601    Unscheduled  Apply ace wrap, tight bra, or binder  As Needed      Comments: Apply if patient bottle feeding and desires.    07/17/23 1601    Unscheduled  Apply ice packs  As Needed      Comments: Apply if patient bottle feeding and desires.    07/17/23 1601    Unscheduled  Rubella Antibody, IgG  As Needed        Comments: RN to release order for lab to be drawn within first day of delivery if maternal status unknown.      07/17/23 1601    Unscheduled  RPR  As Needed        Comments: Within first day of delivery if maternal status unknown.      07/17/23 1601    Unscheduled  Hepatitis B Surface Antigen  As Needed        Comments: Within first day of delivery if maternal status unknown.      07/17/23 1601    Unscheduled  HIV-1 & HIV-2 Antibodies  As Needed        Comments: If patient agreeable, perform within first day of delivery if maternal status unknown.      07/17/23 1601

## 2023-07-18 NOTE — DISCHARGE SUMMARY
Discharge Summary     Roly Newton  : 2000  MRN: [id number]  CSN: [id number]    Date of Admission: 2023   Date of Discharge:  2023   Delivering Physician: Delaney Barth        Admission Diagnosis: Encounter for induction of labor [Z34.90]   Discharge Diagnosis: Same as above plus  Pregnancy at 39w1d - delivered    3.   Anemia   Procedures: 2023  - Vaginal, Spontaneous       Hospital Course  Patient is a 23 y.o.  who at 39w1d had a vaginal birth.  Her postpartum course was without complications.  On PPD #1 she was ready for discharge.  She had normal lochia and pain well controlled with oral medications.    Infant  male  fetus weighing 3867 g (8 lb 8.4 oz)   Apgars -  8 @ 1 minute /  9 @ 5 minutes.    Discharge labs  Lab Results   Component Value Date    WBC 13.71 (H) 2023    HGB 10.3 (L) 2023    HCT 31.5 (L) 2023     2023       Discharge Medications     Discharge Medications        New Medications        Instructions Start Date   acetaminophen 325 MG tablet  Commonly known as: TYLENOL   650 mg, Oral, Every 6 Hours PRN      ferrous sulfate 324 (65 Fe) MG tablet delayed-release EC tablet   324 mg, Oral, Daily With Breakfast   Start Date: 2023     ibuprofen 600 MG tablet  Commonly known as: ADVIL,MOTRIN   600 mg, Oral, Every 6 Hours PRN             Changes to Medications        Instructions Start Date   docusate sodium 100 MG capsule  What changed:   when to take this  reasons to take this   100 mg, Oral, 2 Times Daily PRN             Continue These Medications        Instructions Start Date   omeprazole 20 MG capsule  Commonly known as: priLOSEC   20 mg, Oral, Daily      PRENATAL GUMMY VITAMIN   1 each, Oral, Daily             Stop These Medications      doxylamine 25 MG tablet  Commonly known as: UNISOM     famotidine 20 MG tablet  Commonly known as: Pepcid     Metamucil Smooth Texture 58.6 % powder  Generic drug: psyllium      metoclopramide 10 MG tablet  Commonly known as: Reglan     vitamin B-6 25 MG tablet  Commonly known as: PYRIDOXINE              Discharge Disposition Home or Self Care   Condition on Discharge: good   Follow-up: 6 weeks with [facility] KIRTI Abdi PAFrancC  7/18/2023

## 2023-07-19 NOTE — PAYOR COMM NOTE
"  D/c summary  Ur manager; miranda kirk [phone] and fax [phone]    Landon Do (23 y.o. Female)       Date of Birth   2000    Social Security Number       Address   [address] [address]    Home Phone   [phone]    MRN   [id number]       Worship   None    Marital Status   Single                            Admission Date   7/17/23    Admission Type   Elective    Admitting Provider   Samuel Aaron MD    Attending Provider       Department, Room/Bed   [facility], W202/1       Discharge Date   7/18/2023    Discharge Disposition   Home or Self Care    Discharge Destination                                 Attending Provider: (none)   Allergies: No Known Allergies    Isolation: None   Infection: None   Code Status: Prior    Ht: 170.2 cm (67\")   Wt: 106 kg (234 lb)    Admission Cmt: None   Principal Problem: Encounter for induction of labor [Z34.90]                   Active Insurance as of 7/17/2023       Primary Coverage       Payor Plan Insurance Group Employer/Plan Group    [employer] [place] [employer] [place]ICA[place]        Payor Plan Address Payor Plan Phone Number Payor Plan Fax Number Effective Dates    [address] [address] [phone]  7/5/2017 - None Entered    [facility] [id number]         Subscriber Name Subscriber Birth Date Member ID       LANDON DO 2000 [id number]                     Emergency Contacts        (Rel.) Home Phone Work Phone Mobile Phone    Niesha Do (Sister) -- -- [phone]    Brinda Do (Mother) [phone] -- --              Physician Progress Notes (last 24 hours)  Notes from 07/18/23 0920 through 07/19/23 0920   No notes of this type exist for this encounter.          Discharge Summary        Kaycee Abdi PA-C at 07/18/23 1339       Attestation signed by Mayda Kurtz MD at 07/18/23 1513    I have reviewed this documentation and agree.    Mayda Kurtz MD   Obstetrics and " Gynecology  [facility]                 Discharge Summary     Roly Newton  : 2000  MRN: [id number]  CSN: [id number]    Date of Admission: 2023   Date of Discharge:  2023   Delivering Physician: Delaney Barth        Admission Diagnosis: Encounter for induction of labor [Z34.90]   Discharge Diagnosis: Same as above plus  Pregnancy at 39w1d - delivered    3.   Anemia   Procedures: 2023  - Vaginal, Spontaneous       Hospital Course  Patient is a 23 y.o.  who at 39w1d had a vaginal birth.  Her postpartum course was without complications.  On PPD #1 she was ready for discharge.  She had normal lochia and pain well controlled with oral medications.    Infant  male  fetus weighing 3867 g (8 lb 8.4 oz)   Apgars -  8 @ 1 minute /  9 @ 5 minutes.    Discharge labs  Lab Results   Component Value Date    WBC 13.71 (H) 2023    HGB 10.3 (L) 2023    HCT 31.5 (L) 2023     2023       Discharge Medications     Discharge Medications        New Medications        Instructions Start Date   acetaminophen 325 MG tablet  Commonly known as: TYLENOL   650 mg, Oral, Every 6 Hours PRN      ferrous sulfate 324 (65 Fe) MG tablet delayed-release EC tablet   324 mg, Oral, Daily With Breakfast   Start Date: 2023     ibuprofen 600 MG tablet  Commonly known as: ADVIL,MOTRIN   600 mg, Oral, Every 6 Hours PRN             Changes to Medications        Instructions Start Date   docusate sodium 100 MG capsule  What changed:   when to take this  reasons to take this   100 mg, Oral, 2 Times Daily PRN             Continue These Medications        Instructions Start Date   omeprazole 20 MG capsule  Commonly known as: priLOSEC   20 mg, Oral, Daily      PRENATAL GUMMY VITAMIN   1 each, Oral, Daily             Stop These Medications      doxylamine 25 MG tablet  Commonly known as: UNISOM     famotidine 20 MG tablet  Commonly known as: Pepcid     Metamucil Smooth  Texture 58.6 % powder  Generic drug: psyllium     metoclopramide 10 MG tablet  Commonly known as: Reglan     vitamin B-6 25 MG tablet  Commonly known as: PYRIDOXINE              Discharge Disposition Home or Self Care   Condition on Discharge: good   Follow-up: 6 weeks with [facility] KIRTI Abdi PA-C  7/18/2023    Electronically signed by Mayda Kurtz MD at 07/18/23 2615

## 2023-08-28 ENCOUNTER — POSTPARTUM VISIT (OUTPATIENT)
Dept: OBSTETRICS AND GYNECOLOGY | Facility: CLINIC | Age: 23
End: 2023-08-28
Payer: COMMERCIAL

## 2023-08-28 VITALS
HEIGHT: 67 IN | SYSTOLIC BLOOD PRESSURE: 122 MMHG | DIASTOLIC BLOOD PRESSURE: 80 MMHG | BODY MASS INDEX: 34.69 KG/M2 | WEIGHT: 221 LBS

## 2023-08-28 PROBLEM — Z34.90 ENCOUNTER FOR INDUCTION OF LABOR: Status: RESOLVED | Noted: 2023-07-17 | Resolved: 2023-08-28

## 2023-08-28 NOTE — PROGRESS NOTES
"History  Chief Complaint   Patient presents with    Postpartum Care     \"Spot on labia\"        Sari Newton is a 23 y.o. year old  presenting to be seen for her postpartum visit.  She had a vaginal delivery.  She is concerned about a spot on her right labia.    Since delivery she has not been sexually active.   She does not have concerns about post-partum blues/depression.   She is bottle feeding.  For ongoing contraception, her plans are condoms.  She has had a menstrual cycle. It was          Physical  /80   Ht 170.2 cm (67\")   Wt 100 kg (221 lb)   Breastfeeding No   BMI 34.61 kg/mý     General:  well developed; well nourished  no acute distress   Abdomen: soft, non-tender; no masses   Pelvis: External genitalia:  normal appearance of the external genitalia including Bartholin's and [place]'s glands. Tiny pinpoint purplish area noted on right labia  Uterus:  normal size, shape and consistency.  Adnexa:  normal bimanual exam of the adnexa.        Assessment   Normal 6 week postpartum exam  Contraceptive management     Plan   BC options reviewed and compared today: condoms  Pap smear not due  Follow up 1 year    No orders of the defined types were placed in this encounter.         This note was electronically signed.  MARISABEL Max  2023  "

## 2024-03-12 ENCOUNTER — INITIAL PRENATAL (OUTPATIENT)
Dept: OBSTETRICS AND GYNECOLOGY | Facility: CLINIC | Age: 24
End: 2024-03-12
Payer: COMMERCIAL

## 2024-03-12 VITALS — BODY MASS INDEX: 35.24 KG/M2 | WEIGHT: 225 LBS | SYSTOLIC BLOOD PRESSURE: 112 MMHG | DIASTOLIC BLOOD PRESSURE: 68 MMHG

## 2024-03-12 DIAGNOSIS — O99.891 HISTORY OF POSTPARTUM DEPRESSION, CURRENTLY PREGNANT: ICD-10-CM

## 2024-03-12 DIAGNOSIS — O99.330 TOBACCO USE DURING PREGNANCY, ANTEPARTUM: ICD-10-CM

## 2024-03-12 DIAGNOSIS — Z13.79 ENCOUNTER FOR GENETIC SCREENING: ICD-10-CM

## 2024-03-12 DIAGNOSIS — O09.899 SHORT INTERVAL BETWEEN PREGNANCIES AFFECTING PREGNANCY, ANTEPARTUM: ICD-10-CM

## 2024-03-12 DIAGNOSIS — Z34.91 PRENATAL CARE IN FIRST TRIMESTER: Primary | ICD-10-CM

## 2024-03-12 DIAGNOSIS — O99.210 OBESITY IN PREGNANCY, ANTEPARTUM: ICD-10-CM

## 2024-03-12 DIAGNOSIS — Z86.59 HISTORY OF POSTPARTUM DEPRESSION, CURRENTLY PREGNANT: ICD-10-CM

## 2024-03-12 NOTE — PROGRESS NOTES
Initial ob visit     CC- Here for care of pregnancy        Sari Newton is a 23 y.o. female, , who presents for her first obstetrical visit.  Patient's last menstrual period was 2024.. Her OG is 10/24/2024, by Last Menstrual Period. Current GA is 7w5d.     Initial positive test date : 24, UPT        Her periods are every 29 days  Prior obstetric issues: tobacco use  Patient's past medical history is significant for:  none  .  Family history of genetic issues (includes FOB): no  Prior infections concerning in pregnancy (Rash, fever in last 2 weeks): No  Varicella Hx - vaccinated  Prior testing for Cystic Fibrosis Carrier or Sickle Cell Trait- no  Prepregnancy BMI - Body mass index is 35.24 kg/m².  History of STD: yes GC/CHLAMYDIA as a teenager, treated  Hx of HSV for patient or partner: yes - cold sores  Ultrasound Today: yes, shows single viable IUP    High Risk Moderate Risk Low Risk   History of preeclampsia or gestational hypertension- No  Multifetal gestation- No  Chronic hypertension- No  Pregestational Diabetes- No  Kidney Disease- No  Autoimmune disease (I.e. lupus, antiphospholipid syndrome)- No Nulliparity- No  Obesity (BMI>30)- Yes  Family history of preeclampsia- No  Black race- No  Lower socio-economic status- No  Age 35+ - No  IVF- No  History of SGA Infant- No  >10 year interpregnancy interval- No Prior uncomplicated term delivery and absence of any risk factors- No   Total: 0 Total: 1 Total: 0      For this patient, low dose aspirin is not indicated and aspirin 81mg has not been prescribed.     OB History    Para Term  AB Living   5 3 3   1 3   SAB IAB Ectopic Molar Multiple Live Births   1       0 3      # Outcome Date GA Lbr Govind/2nd Weight Sex Type Anes PTL Lv   5 Current            4 Term 23 39w1d 05:13 / 00:59 3867 g (8 lb 8.4 oz) M Vag-Spont EPI N MARGRET   3 Term 19 39w0d  3487 g (7 lb 11 oz) F Vag-Spont   MARGRET   2 Term 10/06/17 40w0d  3742 g (8  lb 4 oz) M Vag-Spont   MARGRET   1 SAB 10/01/15 11w5d              Additional Pertinent History   Last Pap : 12/22/22 Result: negative HPV: not done   STD neg      Last Completed Pap Smear            PAP SMEAR (Every 3 Years) Next due on 12/22/2025 12/22/2022  LIQUID-BASED PAP SMEAR, [facility] ([facility])                  History of abnormal Pap smear: no  Family history of uterine, colon, breast, or ovarian cancer: no  Feelings of Anxiety or Depression: no  Tobacco Usage?: Yes Sari Newton  reports that she has been smoking cigarettes. She has a 1.5 pack-year smoking history. She does not have any smokeless tobacco history on file. I have educated her on the risk of diseases from using tobacco products such as cancer, COPD, heart disease, reproductive problems, and low birth weight.     I advised her to quit and she is willing to quit. We have discussed the following method/s for tobacco cessation:  Education Material Counseling [place].  Together we have set a quit date for 1 month from today.  She will follow up with me in 4 weeks or sooner to check on her progress.    I spent 3  minutes counseling the patient.        Alcohol/Drug Use?: NO  Over the age of 35 at delivery: no  Genetic Screening: desires [id number] with gender   Flu Status:  none    PMH  No current outpatient medications on file.     Past Medical History:   Diagnosis Date    Anemia     History of blood transfusion 2012    first period lasted 45 days, required blood transfusion    Miscarriage     Postpartum depression     1st baby    Urinary tract infection         Past Surgical History:   Procedure Laterality Date    COLONOSCOPY      ENDOSCOPY         Review of Systems   Review of Systems    Patient Reports:  nausea   Patient Denies:excessive vomiting and vaginal bleeding  All systems reviewed and otherwise normal.    I have reviewed and agree with the HPI, ROS, and historical information as entered above. Ramsey Miller MD     BP  112/68   Wt 102 kg (225 lb)   LMP 01/18/2024   BMI 35.24 kg/m²     The additional following portions of the patient's history were reviewed and updated as appropriate: allergies, current medications, past family history, past medical history, past social history, and past surgical history.    Physical Exam  General:  well developed; well nourished  no acute distress   Chest/Respiratory: unlabored   Heart:  not examined   Thyroid: not examined   Breasts:  Not performed.   Abdomen: soft, non-tender; no masses  no umbilical or inguinal hernias are present  no hepato-splenomegaly   Pelvis: Not performed.        Assessment and Plan    Problem List Items Addressed This Visit       Short interval between pregnancies affecting pregnancy, antepartum    Relevant Orders    AMB Referral to Motherhood Connection Program (ONLY [place] Medicaid)    History of postpartum depression, currently pregnant    Relevant Orders    AMB Referral to Motherhood Connection Program (ONLY [place] Medicaid)    Tobacco use during pregnancy, antepartum    Obesity in pregnancy, antepartum     Other Visit Diagnoses       Prenatal care in first trimester    -  Primary    Relevant Orders    Obstetric Panel    Urine Culture - Urine, Urine, Clean Catch    Urine Drug Screen - Urine, Clean Catch    Chlamydia trachomatis, Neisseria gonorrhoeae, PCR w/ confirmation - Urine, Urine, Clean Catch    Varicella Zoster Antibody, IgG    HIV-1 / O / 2 Ag / Antibody    Thyroid Cascade Profile    Hemoglobin A1c    Inheritest (R) CF/SMA Panel - Blood,    AMB Referral to Motherhood Connection Program (ONLY [place] Medicaid)    Encounter for genetic screening        Relevant Orders    Inheritest (R) CF/SMA Panel - Blood,            Pregnancy at 7w5d  Reviewed routine prenatal care with the office and educational materials given  Lab(s) Ordered  Discussed options for genetic testing including first trimester nuchal translucency screen, genetic disease carrier testing, quadruple screen,  and NIPT  Discontinue the use of all non-medicinal drugs and chemicals  Nausea/Vomiting - she does not desire medications at this time.  Discussed conservative ways to help with nausea.  Patient is on Prenatal vitamins  Activity recommendation : 150 minutes/week of moderate intensity aerobic activity unless we limit for bleeding, hypertension or other pregnancy complication   U/S ordered at follow up  Recommend limiting weight gain to 15 to 20 pounds in pregnancy.   Discussed carbohydrate control.   hgb A1C today  TFT today  16-18 week GTT  Return in about 4 weeks (around 4/9/2024) for Prenatal Care.      Ramsey Miller MD  03/12/2024

## 2024-03-14 ENCOUNTER — REFERRAL TRIAGE (OUTPATIENT)
Dept: LABOR AND DELIVERY | Facility: HOSPITAL | Age: 24
End: 2024-03-14
Payer: COMMERCIAL

## 2024-03-14 RX ORDER — AMOXICILLIN 500 MG/1
500 CAPSULE ORAL 2 TIMES DAILY
Qty: 10 CAPSULE | Refills: 0 | Status: SHIPPED | OUTPATIENT
Start: 2024-03-14 | End: 2024-03-19

## 2024-03-16 LAB
ABO GROUP BLD: ABNORMAL
AMPHETAMINES UR QL SCN: NEGATIVE NG/ML
BACTERIA UR CULT: ABNORMAL
BACTERIA UR CULT: ABNORMAL
BARBITURATES UR QL SCN: NEGATIVE NG/ML
BASOPHILS # BLD AUTO: 0.1 X10E3/UL (ref 0–0.2)
BASOPHILS NFR BLD AUTO: 1 %
BENZODIAZ UR QL SCN: NEGATIVE NG/ML
BLD GP AB SCN SERPL QL: NEGATIVE
BZE UR QL SCN: NEGATIVE NG/ML
C TRACH RRNA SPEC QL NAA+PROBE: NEGATIVE
CANNABINOIDS UR QL SCN: POSITIVE NG/ML
CITATION REF LAB TEST: NORMAL
CLINICAL GENETICS COUNSELING NOTE: NORMAL
CLINICAL INFO: NORMAL
CREAT UR-MCNC: 323.8 MG/DL (ref 20–300)
EOSINOPHIL # BLD AUTO: 0 X10E3/UL (ref 0–0.4)
EOSINOPHIL NFR BLD AUTO: 0 %
ERYTHROCYTE [DISTWIDTH] IN BLOOD BY AUTOMATED COUNT: 12.7 % (ref 11.7–15.4)
ETHNIC BACKGROUND STATED: NORMAL
GENE DIS ANL CARRIER INTERP-IMP: NORMAL
GENE STUDIED ID: NORMAL
HBA1C MFR BLD: 5 % (ref 4.8–5.6)
HBV SURFACE AG SERPL QL IA: NEGATIVE
HCT VFR BLD AUTO: 44.6 % (ref 34–46.6)
HCV IGG SERPL QL IA: NON REACTIVE
HGB BLD-MCNC: 15 G/DL (ref 11.1–15.9)
HIV 1+2 AB+HIV1 P24 AG SERPL QL IA: NON REACTIVE
IMM GRANULOCYTES # BLD AUTO: 0 X10E3/UL (ref 0–0.1)
IMM GRANULOCYTES NFR BLD AUTO: 0 %
LAB DIRECTOR NAME PROVIDER: NORMAL
LABORATORY COMMENT REPORT: ABNORMAL
LYMPHOCYTES # BLD AUTO: 3.1 X10E3/UL (ref 0.7–3.1)
LYMPHOCYTES NFR BLD AUTO: 30 %
Lab: NORMAL
MCH RBC QN AUTO: 33 PG (ref 26.6–33)
MCHC RBC AUTO-ENTMCNC: 33.6 G/DL (ref 31.5–35.7)
MCV RBC AUTO: 98 FL (ref 79–97)
METHADONE UR QL SCN: NEGATIVE NG/ML
MOL DX INTERP BLD/T QL: NORMAL
MONOCYTES # BLD AUTO: 0.7 X10E3/UL (ref 0.1–0.9)
MONOCYTES NFR BLD AUTO: 7 %
N GONORRHOEA RRNA SPEC QL NAA+PROBE: NEGATIVE
NEUTROPHILS # BLD AUTO: 6.4 X10E3/UL (ref 1.4–7)
NEUTROPHILS NFR BLD AUTO: 62 %
OPIATES UR QL SCN: NEGATIVE NG/ML
OTHER ANTIBIOTIC SUSC ISLT: ABNORMAL
OXYCODONE+OXYMORPHONE UR QL SCN: NEGATIVE NG/ML
PCP UR QL: NEGATIVE NG/ML
PH UR: 5.6 [PH] (ref 4.5–8.9)
PLATELET # BLD AUTO: 235 X10E3/UL (ref 150–450)
PROPOXYPH UR QL SCN: NEGATIVE NG/ML
RBC # BLD AUTO: 4.55 X10E6/UL (ref 3.77–5.28)
REASON FOR REFERRAL (NARRATIVE): NORMAL
RECOMMENDATION PATIENT DOC-IMP: NORMAL
REF LAB TEST METHOD: NORMAL
RH BLD: POSITIVE
RPR SER QL: NON REACTIVE
RUBV IGG SERPL IA-ACNC: 4.85 INDEX
SERVICE CMNT 02-IMP: NORMAL
SERVICE CMNT-IMP: NORMAL
SPECIMEN SOURCE: NORMAL
TSH SERPL DL<=0.005 MIU/L-ACNC: 0.84 UIU/ML (ref 0.45–4.5)
VZV IGG SER IA-ACNC: 469 INDEX
WBC # BLD AUTO: 10.4 X10E3/UL (ref 3.4–10.8)

## 2024-03-26 LAB
ABO GROUP BLD: ABNORMAL
AMPHETAMINES UR QL SCN: NEGATIVE NG/ML
BACTERIA UR CULT: ABNORMAL
BACTERIA UR CULT: ABNORMAL
BARBITURATES UR QL SCN: NEGATIVE NG/ML
BASOPHILS # BLD AUTO: 0.1 X10E3/UL (ref 0–0.2)
BASOPHILS NFR BLD AUTO: 1 %
BENZODIAZ UR QL SCN: NEGATIVE NG/ML
BLD GP AB SCN SERPL QL: NEGATIVE
BZE UR QL SCN: NEGATIVE NG/ML
C TRACH RRNA SPEC QL NAA+PROBE: NEGATIVE
CANNABINOIDS UR QL SCN: POSITIVE NG/ML
CITATION REF LAB TEST: NORMAL
CREAT UR-MCNC: 323.8 MG/DL (ref 20–300)
EOSINOPHIL # BLD AUTO: 0 X10E3/UL (ref 0–0.4)
EOSINOPHIL NFR BLD AUTO: 0 %
ERYTHROCYTE [DISTWIDTH] IN BLOOD BY AUTOMATED COUNT: 12.7 % (ref 11.7–15.4)
ETHNIC BACKGROUND STATED: NORMAL
GENE DIS ANL CARRIER INTERP-IMP: NORMAL
GENE STUDIED ID: NORMAL
HBA1C MFR BLD: 5 % (ref 4.8–5.6)
HBV SURFACE AG SERPL QL IA: NEGATIVE
HCT VFR BLD AUTO: 44.6 % (ref 34–46.6)
HCV IGG SERPL QL IA: NON REACTIVE
HGB BLD-MCNC: 15 G/DL (ref 11.1–15.9)
HIV 1+2 AB+HIV1 P24 AG SERPL QL IA: NON REACTIVE
IMM GRANULOCYTES # BLD AUTO: 0 X10E3/UL (ref 0–0.1)
IMM GRANULOCYTES NFR BLD AUTO: 0 %
LAB DIRECTOR NAME PROVIDER: NORMAL
LABORATORY COMMENT REPORT: ABNORMAL
LYMPHOCYTES # BLD AUTO: 3.1 X10E3/UL (ref 0.7–3.1)
LYMPHOCYTES NFR BLD AUTO: 30 %
Lab: NORMAL
MCH RBC QN AUTO: 33 PG (ref 26.6–33)
MCHC RBC AUTO-ENTMCNC: 33.6 G/DL (ref 31.5–35.7)
MCV RBC AUTO: 98 FL (ref 79–97)
METHADONE UR QL SCN: NEGATIVE NG/ML
MOL DX INTERP BLD/T QL: NORMAL
MONOCYTES # BLD AUTO: 0.7 X10E3/UL (ref 0.1–0.9)
MONOCYTES NFR BLD AUTO: 7 %
N GONORRHOEA RRNA SPEC QL NAA+PROBE: NEGATIVE
NEUTROPHILS # BLD AUTO: 6.4 X10E3/UL (ref 1.4–7)
NEUTROPHILS NFR BLD AUTO: 62 %
OPIATES UR QL SCN: NEGATIVE NG/ML
OTHER ANTIBIOTIC SUSC ISLT: ABNORMAL
OXYCODONE+OXYMORPHONE UR QL SCN: NEGATIVE NG/ML
PCP UR QL: NEGATIVE NG/ML
PH UR: 5.6 [PH] (ref 4.5–8.9)
PLATELET # BLD AUTO: 235 X10E3/UL (ref 150–450)
PROPOXYPH UR QL SCN: NEGATIVE NG/ML
RBC # BLD AUTO: 4.55 X10E6/UL (ref 3.77–5.28)
REASON FOR REFERRAL (NARRATIVE): NORMAL
RECOMMENDATION PATIENT DOC-IMP: NORMAL
REF LAB TEST METHOD: NORMAL
RH BLD: POSITIVE
RPR SER QL: NON REACTIVE
RUBV IGG SERPL IA-ACNC: 4.85 INDEX
SERVICE CMNT-IMP: NORMAL
SPECIMEN SOURCE: NORMAL
TSH SERPL DL<=0.005 MIU/L-ACNC: 0.84 UIU/ML (ref 0.45–4.5)
VZV IGG SER IA-ACNC: 469 INDEX
WBC # BLD AUTO: 10.4 X10E3/UL (ref 3.4–10.8)

## 2024-04-09 ENCOUNTER — ROUTINE PRENATAL (OUTPATIENT)
Dept: OBSTETRICS AND GYNECOLOGY | Facility: CLINIC | Age: 24
End: 2024-04-09
Payer: COMMERCIAL

## 2024-04-09 VITALS — BODY MASS INDEX: 35.55 KG/M2 | DIASTOLIC BLOOD PRESSURE: 70 MMHG | WEIGHT: 227 LBS | SYSTOLIC BLOOD PRESSURE: 118 MMHG

## 2024-04-09 DIAGNOSIS — O99.891 BACTERIURIA DURING PREGNANCY: ICD-10-CM

## 2024-04-09 DIAGNOSIS — O99.320 MARIJUANA USE DURING PREGNANCY: ICD-10-CM

## 2024-04-09 DIAGNOSIS — R82.71 BACTERIURIA DURING PREGNANCY: ICD-10-CM

## 2024-04-09 DIAGNOSIS — Z34.91 PRENATAL CARE IN FIRST TRIMESTER: Primary | ICD-10-CM

## 2024-04-09 DIAGNOSIS — Z36.0 ENCOUNTER FOR ANTENATAL SCREENING FOR CHROMOSOMAL ANOMALIES: ICD-10-CM

## 2024-04-09 DIAGNOSIS — F12.90 MARIJUANA USE DURING PREGNANCY: ICD-10-CM

## 2024-04-09 PROCEDURE — 99213 OFFICE O/P EST LOW 20 MIN: CPT | Performed by: OBSTETRICS & GYNECOLOGY

## 2024-04-09 RX ORDER — DOXYLAMINE SUCCINATE AND PYRIDOXINE HYDROCHLORIDE 20; 20 MG/1; MG/1
1 TABLET, EXTENDED RELEASE ORAL EVERY 12 HOURS SCHEDULED
Qty: 60 TABLET | Refills: 3 | Status: SHIPPED | OUTPATIENT
Start: 2024-04-09

## 2024-04-09 RX ORDER — ONDANSETRON 4 MG/1
4 TABLET, ORALLY DISINTEGRATING ORAL EVERY 8 HOURS PRN
Qty: 30 TABLET | Refills: 1 | Status: SHIPPED | OUTPATIENT
Start: 2024-04-09

## 2024-04-09 NOTE — PROGRESS NOTES
OB FOLLOW UP  CC- Here for care of pregnancy        Sari Newton is a 23 y.o.  11w5d patient being seen today for her obstetrical follow up visit. Patient reports worsening nausea without vomiting.  Got engaged this weekend! Desires repeat UDS as she quit THC a few months ago.    Her prenatal care is complicated by (and status) :   Patient Active Problem List   Diagnosis    Short interval between pregnancies affecting pregnancy, antepartum    History of postpartum depression, currently pregnant    Tobacco use during pregnancy, antepartum    Obesity in pregnancy, antepartum       Genetic testing?: desires with gender.  NOB labs reviewed  Flu Status: Declines  Ultrasound Today: No    ROS -   Patient Denies: leaking of fluid, vaginal bleeding, dysuria, and excessive vomiting  All other systems reviewed and are negative.     The additional following portions of the patient's history were reviewed and updated as appropriate: allergies and current medications.    I have reviewed and agree with the HPI, ROS, and historical information as entered above. Ramsey Miller MD         /70   Wt 103 kg (227 lb)   LMP 2024   BMI 35.55 kg/m²         EXAM:     Prenatal Vitals  BP: 118/70  Weight: 103 kg (227 lb)   Fetal Heart Rate: US                  Assessment and Plan    Problem List Items Addressed This Visit    None  Visit Diagnoses       Prenatal care in first trimester    -  Primary    Relevant Orders    US Ob < 14 Weeks Single or First Gestation    Encounter for  screening for chromosomal anomalies        Relevant Orders    [id number] PLUS Core+SCA+ESS - Blood,    Bacteriuria during pregnancy        Relevant Orders    Urine Culture - Urine, Urine, Clean Catch    Marijuana use during pregnancy        Relevant Orders    Urine Drug Screen - Urine, Clean Catch            Pregnancy at 11w5d  Labs reviewed from New OB Visit.  Counseled on genetic testing, carrier status and option for NT  screen  Activity and Exercise discussed.  Patient is on Prenatal vitamins  Reviewed UDS and risks during pregnancy;   Return in about 4 weeks (around 5/7/2024) for Prenatal Care with Early GLUCOLA.    Ramsey Miller MD  04/09/2024

## 2024-04-10 LAB
AMPHETAMINES UR QL SCN: NEGATIVE NG/ML
BARBITURATES UR QL SCN: NEGATIVE NG/ML
BENZODIAZ UR QL SCN: NEGATIVE NG/ML
BZE UR QL SCN: NEGATIVE NG/ML
CANNABINOIDS UR QL SCN: NEGATIVE NG/ML
CREAT UR-MCNC: 268.1 MG/DL (ref 20–300)
LABORATORY COMMENT REPORT: NORMAL
METHADONE UR QL SCN: NEGATIVE NG/ML
OPIATES UR QL SCN: NEGATIVE NG/ML
OXYCODONE+OXYMORPHONE UR QL SCN: NEGATIVE NG/ML
PCP UR QL: NEGATIVE NG/ML
PH UR: 6.2 [PH] (ref 4.5–8.9)
PROPOXYPH UR QL SCN: NEGATIVE NG/ML

## 2024-04-11 LAB
BACTERIA UR CULT: NORMAL
BACTERIA UR CULT: NORMAL

## 2024-04-24 ENCOUNTER — PATIENT OUTREACH (OUTPATIENT)
Dept: LABOR AND DELIVERY | Facility: HOSPITAL | Age: 24
End: 2024-04-24
Payer: COMMERCIAL
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
## 2024-05-07 ENCOUNTER — PATIENT OUTREACH (OUTPATIENT)
Dept: LABOR AND DELIVERY | Facility: HOSPITAL | Age: 24
End: 2024-05-07
Payer: COMMERCIAL

## 2024-05-23 ENCOUNTER — TELEPHONE (OUTPATIENT)
Dept: OBSTETRICS AND GYNECOLOGY | Facility: CLINIC | Age: 24
End: 2024-05-23

## 2024-05-23 NOTE — TELEPHONE ENCOUNTER
Hub staff attempted to follow warm transfer process and was unsuccessful     Caller: Sari Newton    Relationship to patient: Self    Best call back number: [phone] CALL ANYTIME, IT IS OKAY TO LVM.    Patient is needing: OB PATIENT CALLED TO RESCHEDULE MISSED APPT FROM 05/07/24. OFFICE NOTE:Return in about 4 weeks (around 5/7/2024) for Prenatal Care with Early GLUCOLA.    PATIENT IS REQUESTING [facility] OFFICE ONLY. HUB ONLY AVAILABILITY FOR NEXT WEEK IS 05/28/24. PATIENT IS NOT AVAILABLE THAT MORNING. HUB UNABLE TO WARM TRANSFER TO FIND ANOTHER MORNING APPT BEFORE 06/04/24.    PATIENT REQUESTED TO SCHEDULE FOR 05/28/24 AT 2:40 PM. PLEASE CALL IF MORNING APPT IS NEEDED FOR GLUCOLA. PATIENT WOULD ALSO LIKE TO CONFIRM FASTING IS NOT NEEDED FOR ONE HOUR BUT NEEDS TO AVOID EATING OR DRINKING ANYTHING SUGARY.

## 2024-05-28 ENCOUNTER — ROUTINE PRENATAL (OUTPATIENT)
Dept: OBSTETRICS AND GYNECOLOGY | Facility: CLINIC | Age: 24
End: 2024-05-28
Payer: COMMERCIAL

## 2024-05-28 VITALS — DIASTOLIC BLOOD PRESSURE: 70 MMHG | SYSTOLIC BLOOD PRESSURE: 116 MMHG | WEIGHT: 229 LBS | BODY MASS INDEX: 35.87 KG/M2

## 2024-05-28 DIAGNOSIS — Z34.82 MULTIGRAVIDA IN SECOND TRIMESTER: Primary | ICD-10-CM

## 2024-05-28 DIAGNOSIS — O99.210 OBESITY IN PREGNANCY, ANTEPARTUM: ICD-10-CM

## 2024-05-28 PROCEDURE — 99213 OFFICE O/P EST LOW 20 MIN: CPT | Performed by: OBSTETRICS & GYNECOLOGY

## 2024-05-28 NOTE — PROGRESS NOTES
OB FOLLOW UP  CC- Here for care of pregnancy        Sari Newton is a 23 y.o.  18w5d patient being seen today for her obstetrical follow up visit. Patient reports having intermittent swelling in her ankles and feet that is not really relieved by propping them up. Patient states that the only thing that seems to help is laying in a warm bath.      Her prenatal care is complicated by (and status) : see below.  Patient Active Problem List   Diagnosis    Short interval between pregnancies affecting pregnancy, antepartum    History of postpartum depression, currently pregnant    Tobacco use during pregnancy, antepartum    Obesity in pregnancy, antepartum          Ultrasound Today: No    AFP: declines    ROS -   Patient Denies: leaking of fluid, vaginal bleeding, dysuria, excessive vomiting, and more than 6 contractions per hour  All other systems reviewed and are negative.       The additional following portions of the patient's history were reviewed and updated as appropriate: allergies, current medications, past family history, past medical history, past social history, past surgical history, and problem list.      I have reviewed and agree with the HPI, ROS, and historical information as entered above. Ramsey Miller MD         EXAM:     Prenatal Vitals  BP: 116/70  Weight: 104 kg (229 lb)   Fetal Heart Rate: 130                     Assessment and Plan    Problem List Items Addressed This Visit       Obesity in pregnancy, antepartum    Relevant Orders    Gestational Screen 1 Hr ([facility])     Other Visit Diagnoses       Multigravida in second trimester    -  Primary    Relevant Orders    Gestational Screen 1 Hr ([facility])            Pregnancy at 18w5d  Fetal status reassuring.   Counseled on MSAFP alone in relation to OTD and placental issues.    Anatomy scan next visit.   Activity and Exercise discussed.  Patient is on Prenatal vitamins  Return in about 4 weeks (around 2024) for Prenatal Care  with BIBIANAO.    Ramsey Miller MD  05/28/2024

## 2024-05-29 LAB — GLUCOSE 1H P 50 G GLC PO SERPL-MCNC: 90 MG/DL (ref 70–139)

## 2024-06-06 ENCOUNTER — PATIENT OUTREACH (OUTPATIENT)
Dept: LABOR AND DELIVERY | Facility: HOSPITAL | Age: 24
End: 2024-06-06
Payer: COMMERCIAL

## 2024-06-06 NOTE — OUTREACH NOTE
[facility]  Unable to Reach       Questions/Answers      Flowsheet Row Responses   Pending Outreach Prenatal Check-in   Call Attempt Second   Outcome No answer/busy, Left message   Next Call Attempt Date 06/10/24            Contact info provided, encouraged to call if she has any questions, concerns, or needs assistance with resources.    RAMOS Bruner RN  Maternity Nurse Navigator    6/6/2024, 13:24 EDT

## 2024-06-10 ENCOUNTER — PATIENT OUTREACH (OUTPATIENT)
Dept: LABOR AND DELIVERY | Facility: HOSPITAL | Age: 24
End: 2024-06-10
Payer: COMMERCIAL

## 2024-06-10 NOTE — OUTREACH NOTE
[facility]  Check-In    Current Estimated Gestational Age: 20w4d      Questions/Answers      Flowsheet Row Responses   Best Method for Contacting Cell   Demographics Reviewed Yes   Currently Employed No   Able to keep appointments as scheduled Yes   Gender(s) and Name(s) Boy   Baby Active/Feeling Fetal Movemen No, Early in Pregnancy   How are you presently feeling? Nauseated   Are you having any of the following symptoms? Headache, Nausea/Vomiting   Questions regarding prenatal visits or tests to be ordered? No   Education related to new diagnoses/home equipment No   Resource/Environmental Concerns Financial   Do you have any questions related to your care experience, your pregnancy, plans for delivery, any concerns, etc? No            Sari states she has been nauseated again and the Zofran only helps sometimes. She also reports headaches at times that Tylenol is not effective for. Encouraged to notify her provider if they continue to be bad like that. Denies other concerning symptoms. States taking a nap helps sometimes but her middle child who is almost 5 doesn't take naps anymore and it makes it difficult to do that as she can't be left unattended.    States she has reviewed the [id number] message previously sent and that reminds [facility].    No questions or concerns noted at present. States she will be having her anatomy scan at her next appointment in 2 weeks. Will call back after that to touch base.    RAMOS Bruner RN  Maternity Nurse Navigator    6/10/2024, 14:59 EDT

## 2024-06-25 ENCOUNTER — ROUTINE PRENATAL (OUTPATIENT)
Dept: OBSTETRICS AND GYNECOLOGY | Facility: CLINIC | Age: 24
End: 2024-06-25
Payer: COMMERCIAL

## 2024-06-25 VITALS — WEIGHT: 234 LBS | DIASTOLIC BLOOD PRESSURE: 78 MMHG | SYSTOLIC BLOOD PRESSURE: 102 MMHG | BODY MASS INDEX: 36.65 KG/M2

## 2024-06-25 DIAGNOSIS — O99.210 OBESITY IN PREGNANCY, ANTEPARTUM: ICD-10-CM

## 2024-06-25 DIAGNOSIS — O99.330 TOBACCO USE DURING PREGNANCY, ANTEPARTUM: ICD-10-CM

## 2024-06-25 DIAGNOSIS — O09.899 SHORT INTERVAL BETWEEN PREGNANCIES AFFECTING PREGNANCY, ANTEPARTUM: ICD-10-CM

## 2024-06-25 DIAGNOSIS — R51.9 PREGNANCY HEADACHE IN SECOND TRIMESTER: ICD-10-CM

## 2024-06-25 DIAGNOSIS — O26.892 PREGNANCY HEADACHE IN SECOND TRIMESTER: ICD-10-CM

## 2024-06-25 DIAGNOSIS — Z3A.22 22 WEEKS GESTATION OF PREGNANCY: Primary | ICD-10-CM

## 2024-06-25 LAB
GLUCOSE UR STRIP-MCNC: NEGATIVE MG/DL
PROT UR STRIP-MCNC: NEGATIVE MG/DL

## 2024-06-25 PROCEDURE — 99213 OFFICE O/P EST LOW 20 MIN: CPT | Performed by: OBSTETRICS & GYNECOLOGY

## 2024-06-25 RX ORDER — DIPHENHYDRAMINE HCL 25 MG
25 TABLET ORAL EVERY 6 HOURS PRN
Qty: 90 TABLET | Refills: 1 | Status: SHIPPED | OUTPATIENT
Start: 2024-06-25

## 2024-06-25 RX ORDER — METOCLOPRAMIDE 10 MG/1
10 TABLET ORAL EVERY 6 HOURS PRN
Qty: 90 TABLET | Refills: 1 | Status: SHIPPED | OUTPATIENT
Start: 2024-06-25

## 2024-06-25 NOTE — PROGRESS NOTES
OB FOLLOW UP  CC- Here for care of pregnancy        Sari Newton is a 24 y.o.  22w5d patient being seen today for her obstetrical follow up visit. Patient reports having really bad migraines have been worse here lately and has been taking tylenol but not helping.     Her prenatal care is complicated by (and status) : see below.  Patient Active Problem List   Diagnosis    Short interval between pregnancies affecting pregnancy, antepartum    History of postpartum depression, currently pregnant    Tobacco use during pregnancy, antepartum    Obesity in pregnancy, antepartum    Pregnancy headache in second trimester       Flu Status: Declines  Ultrasound Today: Ye    ROS -     Patient Denies: leaking of fluid, vaginal bleeding, dysuria, excessive vomiting, and more than 6 contractions per hour  Fetal Movement : Yes  All other systems reviewed and are negative.       The additional following portions of the patient's history were reviewed and updated as appropriate: allergies and current medications.      I have reviewed and agree with the HPI, ROS, and historical information as entered above. Ramsey Miller MD     /78   Wt 106 kg (234 lb)   LMP 2024   BMI 36.65 kg/m²       EXAM:     Prenatal Vitals  BP: 102/78  Weight: 106 kg (234 lb)   Fetal Heart Rate: US                  Assessment and Plan    Problem List Items Addressed This Visit       Short interval between pregnancies affecting pregnancy, antepartum    Tobacco use during pregnancy, antepartum    Obesity in pregnancy, antepartum    Pregnancy headache in second trimester    Relevant Medications    diphenhydrAMINE (Benadryl Allergy) 25 MG tablet    metoclopramide (Reglan) 10 MG tablet     Other Visit Diagnoses       22 weeks gestation of pregnancy    -  Primary    Relevant Orders    POC Urinalysis Dipstick            Pregnancy at 22w5d  Anatomy scan today is complete and appear within normal limits.  Fetal status reassuring.    Activity and Exercise discussed.  Patient is on Prenatal vitamins  Reglan/Benadryl for migraine cocktail  Encouraged hydration  Return in about 4 weeks (around 7/23/2024) for Prenatal Care with GLUCOLA.      Ramsey Miller MD  06/25/2024

## 2024-07-08 ENCOUNTER — PATIENT OUTREACH (OUTPATIENT)
Dept: LABOR AND DELIVERY | Facility: HOSPITAL | Age: 24
End: 2024-07-08
Payer: COMMERCIAL

## 2024-07-08 NOTE — OUTREACH NOTE
[facility]  Unable to Reach       Questions/Answers      Flowsheet Row Responses   Pending Outreach Prenatal Check-in   Call Attempt Third   Outcome No answer/busy, Left message   Next Call Attempt Date 08/05/24              RAMOS Bruner RN  Maternity Nurse Navigator    7/8/2024, 11:05 EDT

## 2024-07-29 ENCOUNTER — TELEPHONE (OUTPATIENT)
Dept: OBSTETRICS AND GYNECOLOGY | Facility: CLINIC | Age: 24
End: 2024-07-29

## 2024-07-29 NOTE — TELEPHONE ENCOUNTER
Caller: Sari Newton    Relationship: Self    Best call back number: [phone]    What is the best time to reach you: ANY    Who are you requesting to speak with (clinical staff, provider,  specific staff member):         What was the call regarding:     TRANSFER OF CARE    PT CURRENTLY SEES DR MABRY  SHE WOULD LIKE TO CHANGE TO DR MENDOSA    SHE WILL SEE HER IN EITHER LOCATIONS    PLEASE CALL PT TO SCHEDULE

## 2024-07-31 NOTE — TELEPHONE ENCOUNTER
Per Dr. Bower she will see patient but to let patient know Dr. Miller could still possibly deliver due to being on call when she goes into labor

## 2024-08-01 ENCOUNTER — ROUTINE PRENATAL (OUTPATIENT)
Dept: OBSTETRICS AND GYNECOLOGY | Facility: CLINIC | Age: 24
End: 2024-08-01
Payer: COMMERCIAL

## 2024-08-01 VITALS — BODY MASS INDEX: 37.12 KG/M2 | SYSTOLIC BLOOD PRESSURE: 118 MMHG | DIASTOLIC BLOOD PRESSURE: 68 MMHG | WEIGHT: 237 LBS

## 2024-08-01 DIAGNOSIS — O99.330 TOBACCO USE DURING PREGNANCY, ANTEPARTUM: ICD-10-CM

## 2024-08-01 DIAGNOSIS — O09.899 SHORT INTERVAL BETWEEN PREGNANCIES AFFECTING PREGNANCY, ANTEPARTUM: ICD-10-CM

## 2024-08-01 DIAGNOSIS — O99.210 OBESITY IN PREGNANCY, ANTEPARTUM: ICD-10-CM

## 2024-08-01 DIAGNOSIS — Z34.82 MULTIGRAVIDA IN SECOND TRIMESTER: Primary | ICD-10-CM

## 2024-08-01 LAB
GLUCOSE UR STRIP-MCNC: NEGATIVE MG/DL
PROT UR STRIP-MCNC: NEGATIVE MG/DL

## 2024-08-01 RX ORDER — NICOTINE 21 MG/24HR
1 PATCH, TRANSDERMAL 24 HOURS TRANSDERMAL EVERY 24 HOURS
Qty: 14 PATCH | Refills: 0 | Status: SHIPPED | OUTPATIENT
Start: 2024-08-01

## 2024-08-01 NOTE — PROGRESS NOTES
"      OB FOLLOW UP  CC- Here for care of pregnancy        Sari Newton is a 24 y.o.  28w0d patient being seen today for her obstetrical follow up. Patient reports having pain in her right shoulder blade that radiates down to her hip and thigh x 2 weeks. She has tried OTC Icy Hot, Tylenol and heating pad with no relief. She also c/o \"tingling\" in both of her hands with skin discoloration.      Patient undergoing Glucola testing today. She is due for her testing at 2:36 PM.       MBT: O+  Rhogam: is not indicated.  28 week packet: reviewed with patient   TDAP: given today  Ultrasound Today: No    Her prenatal care is complicated by (and status) : see below.  Patient Active Problem List   Diagnosis    Short interval between pregnancies affecting pregnancy, antepartum    History of postpartum depression, currently pregnant    Tobacco use during pregnancy, antepartum    Obesity in pregnancy, antepartum    Pregnancy headache in second trimester    Multigravida in second trimester         ROS -   Patient Denies: Loss of Fluid, Vaginal Spotting, Vision Changes, Headaches, Nausea , Vomiting , Contractions, Epigastric pain, and skin itching  Fetal Movement : normal    The additional following portions of the patient's history were reviewed and updated as appropriate: allergies, current medications, past family history, past medical history, past social history, past surgical history, and problem list.    I have reviewed and agree with the HPI, ROS, and historical information as entered above. Divya Bower MD      /68   Wt 108 kg (237 lb)   LMP 2024   BMI 37.12 kg/m²         EXAM:     Prenatal Vitals  BP: 118/68  Weight: 108 kg (237 lb)   Fetal Heart Rate: pos      Fundal Height (cm): 30 cm        Urine Glucose Read-only: Negative  Urine Protein Read-only: Negative         Assessment and Plan    Problem List Items Addressed This Visit       Short interval between pregnancies affecting " pregnancy, antepartum    Tobacco use during pregnancy, antepartum    Obesity in pregnancy, antepartum    Multigravida in second trimester - Primary    Relevant Orders    POC Urinalysis Dipstick (Completed)    CBC (No Diff)    Gestational Screen 1 Hr ([facility])    Antibody Screen    RPR Qualitative with Reflex to Quant    Vitamin B12    Folate       Pregnancy at 28w0d  1 hr Glucola, CBC, RPR. Antibody screen and TDAP today  Fetal movement/PTL or Labor precautions  Lab(s) Ordered  Medication(s) Ordered  Reviewed Pre-eclampsia signs/symptoms  Desires Sterilization: Reviewed risks/benefits of BTL vs bilateral salpingectomy. Consent needs to be signed.  Reviewed PP contraception options  Activity and Exercise discussed.  Return in about 4 weeks (around 8/29/2024) for Next scheduled follow up.        Divya Bower MD  08/01/2024

## 2024-08-02 LAB
BLD GP AB SCN SERPL QL: NEGATIVE
ERYTHROCYTE [DISTWIDTH] IN BLOOD BY AUTOMATED COUNT: 12.5 % (ref 11.7–15.4)
FOLATE SERPL-MCNC: 5.4 NG/ML
GLUCOSE 1H P 50 G GLC PO SERPL-MCNC: 113 MG/DL (ref 70–139)
HCT VFR BLD AUTO: 36.4 % (ref 34–46.6)
HGB BLD-MCNC: 11.9 G/DL (ref 11.1–15.9)
MCH RBC QN AUTO: 33.8 PG (ref 26.6–33)
MCHC RBC AUTO-ENTMCNC: 32.7 G/DL (ref 31.5–35.7)
MCV RBC AUTO: 103 FL (ref 79–97)
PLATELET # BLD AUTO: 176 X10E3/UL (ref 150–450)
RBC # BLD AUTO: 3.52 X10E6/UL (ref 3.77–5.28)
RPR SER QL: NON REACTIVE
VIT B12 SERPL-MCNC: 202 PG/ML (ref 232–1245)
WBC # BLD AUTO: 11.3 X10E3/UL (ref 3.4–10.8)

## 2024-08-02 NOTE — PROGRESS NOTES
Glucola normal.  B12 level is abnormal.  Recommend taking supplement OTC.  No other reason for palms to look the way they do.  Does she want a derm referral?

## 2024-08-05 ENCOUNTER — PATIENT OUTREACH (OUTPATIENT)
Dept: LABOR AND DELIVERY | Facility: HOSPITAL | Age: 24
End: 2024-08-05
Payer: COMMERCIAL

## 2024-08-05 NOTE — OUTREACH NOTE
"[facility]  Check-In    Current Estimated Gestational Age: 28w4d      Questions/Answers      Flowsheet Row Responses   Best Method for Contacting Cell   Demographics Reviewed Yes   Currently Employed No   Able to keep appointments as scheduled Yes   Gender(s) and Name(s) Boy \"Kody Beaulieu\"   Baby Active/Feeling Fetal Movemen No, Early in Pregnancy   How are you presently feeling? OK, just frustrated with back pain and hands.   Are you having any of the following symptoms? Pain, Other  [upper back pain between shoulder blades, hand numbness, and red palms.]   Other Symptoms: numbness and redness in hands   Questions regarding prenatal visits or tests to be ordered? No   Education related to new diagnoses/home equipment No   Supplies ready for baby Car Seat, Clothing, Crib   Resource/Environmental Concerns Financial   Do you have any questions related to your care experience, your pregnancy, plans for delivery, any concerns, etc? No   Other Education How to find a dentist, Birth Plan, Insurance benefits/Incentives          Feeling well and reports good fetal movement. Denies any concerning symptoms but discussed what to watch out for. C/O pain in upper back with numbness in hands and palmar erythema. States MD does not know why she is having that issue and has referred to dermatology for the hands. She has not seen PT for back pain. Encouraged to ask OB about a referral for that if it might help her pain.    She has not ordered her pump yet. States she may try to just do it with a hand pump. Discussed that that may be difficult but she states she's never had good luck with the electric pumps. Encouraged to work with a LC to learn how to maximize efficiency with using a pump. Discussed breastfeeding video and outpatient lactation clinic support as well as WIC support. She has begun gathering items she will need for baby.    Updated resources provided via [employer] message. Contact information provided. " Encouraged to call with questions, concerns, or for support. Will plan f/u around 35 weeks to ensure she has everything she needs in place and feels ready for delivery.    RAMOS Bruner RN  Maternity Nurse Navigator    8/5/2024, 15:03 EDT

## 2024-08-24 ENCOUNTER — HOSPITAL ENCOUNTER (EMERGENCY)
Facility: HOSPITAL | Age: 24
Discharge: HOME | End: 2024-08-24
Attending: EMERGENCY MEDICINE
Payer: COMMERCIAL

## 2024-08-24 VITALS
HEART RATE: 92 BPM | BODY MASS INDEX: 36.57 KG/M2 | DIASTOLIC BLOOD PRESSURE: 70 MMHG | WEIGHT: 233 LBS | RESPIRATION RATE: 16 BRPM | HEIGHT: 67 IN | OXYGEN SATURATION: 98 % | TEMPERATURE: 98.3 F | SYSTOLIC BLOOD PRESSURE: 128 MMHG

## 2024-08-24 DIAGNOSIS — K08.89 PAIN, DENTAL: Primary | ICD-10-CM

## 2024-08-24 PROCEDURE — 99282 EMERGENCY DEPT VISIT SF MDM: CPT

## 2024-08-24 RX ORDER — PENICILLIN V POTASSIUM 500 MG/1
500 TABLET, FILM COATED ORAL 4 TIMES DAILY
Qty: 28 TABLET | Refills: 0 | Status: SHIPPED | OUTPATIENT
Start: 2024-08-24 | End: 2024-08-31

## 2024-08-24 RX ORDER — PENICILLIN V POTASSIUM 500 MG/1
500 TABLET, FILM COATED ORAL 4 TIMES DAILY
Qty: 28 TABLET | Refills: 0 | Status: SHIPPED | OUTPATIENT
Start: 2024-08-24 | End: 2024-08-24
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
## 2024-08-30 ENCOUNTER — ROUTINE PRENATAL (OUTPATIENT)
Dept: OBSTETRICS AND GYNECOLOGY | Facility: CLINIC | Age: 24
End: 2024-08-30
Payer: COMMERCIAL

## 2024-08-30 VITALS — DIASTOLIC BLOOD PRESSURE: 80 MMHG | BODY MASS INDEX: 37.15 KG/M2 | WEIGHT: 237.2 LBS | SYSTOLIC BLOOD PRESSURE: 126 MMHG

## 2024-08-30 DIAGNOSIS — O09.899 SHORT INTERVAL BETWEEN PREGNANCIES AFFECTING PREGNANCY, ANTEPARTUM: ICD-10-CM

## 2024-08-30 DIAGNOSIS — O99.330 TOBACCO USE DURING PREGNANCY, ANTEPARTUM: ICD-10-CM

## 2024-08-30 DIAGNOSIS — O99.210 OBESITY IN PREGNANCY, ANTEPARTUM: ICD-10-CM

## 2024-08-30 DIAGNOSIS — Z34.83 MULTIGRAVIDA IN THIRD TRIMESTER: Primary | ICD-10-CM

## 2024-08-30 DIAGNOSIS — Z34.93 THIRD TRIMESTER PREGNANCY: ICD-10-CM

## 2024-08-30 NOTE — PROGRESS NOTES
OB FOLLOW UP  CC- Here for care of pregnancy        Sari Newton is a 24 y.o.  32w1d patient being seen today for her obstetrical follow up visit. Patient reports having pelvis pressure.     Her prenatal care is complicated by (and status) :  see below.  Patient Active Problem List   Diagnosis    Short interval between pregnancies affecting pregnancy, antepartum    History of postpartum depression, currently pregnant    Tobacco use during pregnancy, antepartum    Obesity in pregnancy, antepartum    Pregnancy headache in second trimester    Third trimester pregnancy    Multigravida in third trimester          TDAP status: received at last visit  Rhogam status: was not indicated  28 week labs: Reviewed  Ultrasound Today: No  Non Stress Test: No.      ROS -   Patient Denies: Loss of Fluid, Vaginal Spotting, Vision Changes, Headaches, Nausea , Vomiting , Contractions, Epigastric pain, and skin itching  Fetal Movement : normal  All other systems reviewed and are negative.       The additional following portions of the patient's history were reviewed and updated as appropriate: allergies, current medications, past family history, past medical history, past social history, past surgical history, and problem list.    I have reviewed and agree with the HPI, ROS, and historical information as entered above. Divya Bower MD      /80   Wt 108 kg (237 lb 3.2 oz)   LMP 2024   BMI 37.15 kg/m²         EXAM:     Prenatal Vitals  BP: 126/80  Weight: 108 kg (237 lb 3.2 oz)   Fetal Heart Rate: pos      Fundal Height (cm): 32 cm                    Assessment and Plan    Problem List Items Addressed This Visit       Short interval between pregnancies affecting pregnancy, antepartum    Tobacco use during pregnancy, antepartum    Obesity in pregnancy, antepartum    Third trimester pregnancy    Multigravida in third trimester - Primary       Pregnancy at 32w1d  Fetal status reassuring.  28 week labs  reviewed.    Activity and Exercise discussed.  Fetal movement/PTL or Labor precautions  Reviewed Pre-eclampsia signs/symptoms  Reviewed PP contraception options  Comfort measures  Return in about 2 weeks (around 9/13/2024) for Next scheduled follow up.    Divya Bower MD  08/30/2024

## 2024-09-01 PROBLEM — Z34.83 MULTIGRAVIDA IN THIRD TRIMESTER: Status: ACTIVE | Noted: 2024-09-01

## 2024-09-01 PROBLEM — Z34.93 THIRD TRIMESTER PREGNANCY: Status: ACTIVE | Noted: 2024-08-01

## 2024-09-13 ENCOUNTER — ROUTINE PRENATAL (OUTPATIENT)
Dept: OBSTETRICS AND GYNECOLOGY | Facility: CLINIC | Age: 24
End: 2024-09-13
Payer: COMMERCIAL

## 2024-09-13 VITALS — WEIGHT: 237 LBS | DIASTOLIC BLOOD PRESSURE: 70 MMHG | BODY MASS INDEX: 37.12 KG/M2 | SYSTOLIC BLOOD PRESSURE: 122 MMHG

## 2024-09-13 DIAGNOSIS — O09.899 SHORT INTERVAL BETWEEN PREGNANCIES AFFECTING PREGNANCY, ANTEPARTUM: ICD-10-CM

## 2024-09-13 DIAGNOSIS — Z3A.34 34 WEEKS GESTATION OF PREGNANCY: Primary | ICD-10-CM

## 2024-09-13 DIAGNOSIS — O99.330 TOBACCO USE DURING PREGNANCY, ANTEPARTUM: ICD-10-CM

## 2024-09-19 ENCOUNTER — PATIENT OUTREACH (OUTPATIENT)
Dept: LABOR AND DELIVERY | Facility: HOSPITAL | Age: 24
End: 2024-09-19
Payer: COMMERCIAL

## 2024-09-27 ENCOUNTER — ROUTINE PRENATAL (OUTPATIENT)
Dept: OBSTETRICS AND GYNECOLOGY | Facility: CLINIC | Age: 24
End: 2024-09-27
Payer: COMMERCIAL

## 2024-09-27 VITALS — DIASTOLIC BLOOD PRESSURE: 72 MMHG | SYSTOLIC BLOOD PRESSURE: 118 MMHG | BODY MASS INDEX: 37.43 KG/M2 | WEIGHT: 239 LBS

## 2024-09-27 DIAGNOSIS — Z3A.36 36 WEEKS GESTATION OF PREGNANCY: Primary | ICD-10-CM

## 2024-09-27 DIAGNOSIS — O09.899 SHORT INTERVAL BETWEEN PREGNANCIES AFFECTING PREGNANCY, ANTEPARTUM: ICD-10-CM

## 2024-09-27 DIAGNOSIS — O99.330 TOBACCO USE DURING PREGNANCY, ANTEPARTUM: ICD-10-CM

## 2024-09-27 LAB
CLARITY, POC: CLEAR
COLOR UR: YELLOW
GLUCOSE UR STRIP-MCNC: NEGATIVE MG/DL
PROT UR STRIP-MCNC: NEGATIVE MG/DL

## 2024-09-28 PROBLEM — Z3A.36 36 WEEKS GESTATION OF PREGNANCY: Status: ACTIVE | Noted: 2024-09-28

## 2024-10-01 LAB — B-HEM STREP SPEC QL CULT: NEGATIVE

## 2024-10-04 ENCOUNTER — ROUTINE PRENATAL (OUTPATIENT)
Dept: OBSTETRICS AND GYNECOLOGY | Facility: CLINIC | Age: 24
End: 2024-10-04
Payer: COMMERCIAL

## 2024-10-04 VITALS — SYSTOLIC BLOOD PRESSURE: 124 MMHG | BODY MASS INDEX: 37.46 KG/M2 | DIASTOLIC BLOOD PRESSURE: 70 MMHG | WEIGHT: 239.2 LBS

## 2024-10-04 DIAGNOSIS — O09.899 SHORT INTERVAL BETWEEN PREGNANCIES AFFECTING PREGNANCY, ANTEPARTUM: ICD-10-CM

## 2024-10-04 DIAGNOSIS — O99.330 TOBACCO USE DURING PREGNANCY, ANTEPARTUM: ICD-10-CM

## 2024-10-04 DIAGNOSIS — O99.210 OBESITY IN PREGNANCY, ANTEPARTUM: ICD-10-CM

## 2024-10-04 DIAGNOSIS — Z86.59 HISTORY OF POSTPARTUM DEPRESSION, CURRENTLY PREGNANT: ICD-10-CM

## 2024-10-04 DIAGNOSIS — O99.891 HISTORY OF POSTPARTUM DEPRESSION, CURRENTLY PREGNANT: ICD-10-CM

## 2024-10-04 DIAGNOSIS — Z34.83 MULTIGRAVIDA IN THIRD TRIMESTER: Primary | ICD-10-CM

## 2024-10-04 NOTE — PROGRESS NOTES
OB FOLLOW UP  CC- Here for care of pregnancy        Sari Newton is a 24 y.o.  37w1d patient being seen today for her obstetrical follow up visit. Patient reports nausea. Zofran helps sometimes but she does not want any other medication for relief.     Her prenatal care is complicated by (and status) :  none  Patient Active Problem List   Diagnosis    Short interval between pregnancies affecting pregnancy, antepartum    History of postpartum depression, currently pregnant    Tobacco use during pregnancy, antepartum    Obesity in pregnancy, antepartum    Pregnancy headache in second trimester    Third trimester pregnancy    Multigravida in third trimester    34 weeks gestation of pregnancy    36 weeks gestation of pregnancy       GBS Status:   Strep Gp B NINA   Date Value Ref Range Status   2023 Negative Negative Final     Comment:     Centers for Disease Control and Prevention (CDC) and American Congress  of Obstetricians and Gynecologists (ACOG) guidelines for prevention of   group B streptococcal (GBS) disease specify co-collection of  a vaginal and rectal swab specimen to maximize sensitivity of GBS  detection. Per the CDC and ACOG, swabbing both the lower vagina and  rectum substantially increases the yield of detection compared with  sampling the vagina alone.  Penicillin G, ampicillin, or cefazolin are indicated for intrapartum  prophylaxis of  GBS colonization. Reflex susceptibility  testing should be performed prior to use of clindamycin only on GBS  isolates from penicillin-allergic women who are considered a high risk  for anaphylaxis. Treatment with vancomycin without additional testing  is warranted if resistance to clindamycin is noted.       Strep Gp B Culture   Date Value Ref Range Status   2024 Negative Negative Final     Comment:     Centers for Disease Control and Prevention (CDC) and American Congress  of Obstetricians and Gynecologists (ACOG)  guidelines for prevention of   group B streptococcal (GBS) disease specify co-collection of  a vaginal and rectal swab specimen to maximize sensitivity of GBS  detection. Per the CDC and ACOG, swabbing both the lower vagina and  rectum substantially increases the yield of detection compared with  sampling the vagina alone.  Penicillin G, ampicillin, or cefazolin are indicated for intrapartum  prophylaxis of  GBS colonization. Reflex susceptibility  testing should be performed prior to use of clindamycin only on GBS  isolates from penicillin-allergic women who are considered a high risk  for anaphylaxis. Treatment with vancomycin without additional testing  is warranted if resistance to clindamycin is noted.           No Known Allergies       Flu Status: Already given in current flu season  Her Delivery Plan is: [facility] IOL at 39wks. Scheduled  10/17/2024   today: no  Non Stress Test: No.    ROS -   Patient Denies: Loss of Fluid, Vaginal Spotting, Vision Changes, Headaches, Vomiting , Contractions, Epigastric pain, and skin itching  Fetal Movement : normal  All other systems reviewed and are negative.       The additional following portions of the patient's history were reviewed and updated as appropriate: allergies, current medications, past family history, past medical history, past social history, past surgical history, and problem list.    I have reviewed and agree with the HPI, ROS, and historical information as entered above. Enid Ludwig, APRN        EXAM:     Prenatal Vitals  BP: 124/70  Weight: 109 kg (239 lb 3.2 oz)   Fetal Heart Rate: 140               Unable to void. Did not leave sample.            Assessment and Plan    Problem List Items Addressed This Visit       History of postpartum depression, currently pregnant    Multigravida in third trimester - Primary    Relevant Orders    POC Urinalysis Dipstick    Obesity in pregnancy, antepartum    Short interval between pregnancies  affecting pregnancy, antepartum    Tobacco use during pregnancy, antepartum       Pregnancy at 37w1d  Fetal status reassuring.   Discussed/encouraged social distancing/COVID19 precautions; encouraged vaccination when able  Reviewed Pre-eclampsia signs/symptoms  Delivery options reviewed with patient  Signs of labor reviewed  Kick counts reviewed  Activity and Exercise discussed.  Return in about 1 week (around 10/11/2024) for Paul Johnson.    Enid Ludwig, APRN  10/04/2024

## 2024-10-08 ENCOUNTER — TELEPHONE (OUTPATIENT)
Dept: OBSTETRICS AND GYNECOLOGY | Facility: CLINIC | Age: 24
End: 2024-10-08

## 2024-10-08 NOTE — TELEPHONE ENCOUNTER
Caller: LANDON DO    Relationship:  SELF    Best call back number: [phone]    PATIENT CALLED REQUESTING TO CANCEL SAME DAY APPT.    Did the patient call AFTER the start time of their scheduled appointment?   NO    Was the patient's appointment rescheduled?  YES     Any additional information: OB PT DID NOT HAVE THE GAS TO MAKE IT TO [place] TODAY; R/S FOR 10/10 W/ [facility] EDEN AT [facility] LOCATION; OFFICE OK'D.

## 2024-10-09 ENCOUNTER — HOSPITAL ENCOUNTER (OUTPATIENT)
Facility: HOSPITAL | Age: 24
Discharge: HOME | End: 2024-10-09
Attending: OBSTETRICS & GYNECOLOGY | Admitting: OBSTETRICS & GYNECOLOGY
Payer: COMMERCIAL

## 2024-10-09 VITALS
RESPIRATION RATE: 16 BRPM | SYSTOLIC BLOOD PRESSURE: 117 MMHG | DIASTOLIC BLOOD PRESSURE: 66 MMHG | TEMPERATURE: 98.8 F | HEART RATE: 80 BPM

## 2024-10-09 PROCEDURE — 59025 FETAL NON-STRESS TEST: CPT | Performed by: OBSTETRICS & GYNECOLOGY

## 2024-10-09 PROCEDURE — G0463 HOSPITAL OUTPT CLINIC VISIT: HCPCS

## 2024-10-09 PROCEDURE — 99213 OFFICE O/P EST LOW 20 MIN: CPT | Performed by: OBSTETRICS & GYNECOLOGY

## 2024-10-09 NOTE — LETTER
October 9, 2024      [facility]  [address] [facility] [address]  [phone]          Patient: Sari Newton   YOB: 2000   Date of Visit: 10/9/2024       To Whom It May Concern:    Sari Newton was seen at [facility] on 10/9/2024.    Please excuse  Samy Beaulieu  from work today, 10/9/2024.        Sincerely,       Elsy PAREDES RN
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
## 2024-10-12 ENCOUNTER — PREP FOR SURGERY (OUTPATIENT)
Dept: OTHER | Facility: HOSPITAL | Age: 24
End: 2024-10-12
Payer: COMMERCIAL

## 2024-10-12 RX ORDER — ONDANSETRON 4 MG/1
4 TABLET, ORALLY DISINTEGRATING ORAL EVERY 6 HOURS PRN
Status: CANCELLED | OUTPATIENT
Start: 2024-10-12

## 2024-10-12 RX ORDER — IBUPROFEN 600 MG/1
600 TABLET, FILM COATED ORAL EVERY 6 HOURS PRN
Status: CANCELLED | OUTPATIENT
Start: 2024-10-12

## 2024-10-12 RX ORDER — NALOXONE HCL 0.4 MG/ML
0.4 VIAL (ML) INJECTION
Status: CANCELLED | OUTPATIENT
Start: 2024-10-12

## 2024-10-12 RX ORDER — OXYTOCIN/0.9 % SODIUM CHLORIDE 30/500 ML
999 PLASTIC BAG, INJECTION (ML) INTRAVENOUS ONCE
Status: CANCELLED | OUTPATIENT
Start: 2024-10-12 | End: 2024-10-12

## 2024-10-12 RX ORDER — SODIUM CHLORIDE, SODIUM LACTATE, POTASSIUM CHLORIDE, CALCIUM CHLORIDE 600; 310; 30; 20 MG/100ML; MG/100ML; MG/100ML; MG/100ML
125 INJECTION, SOLUTION INTRAVENOUS CONTINUOUS
Status: CANCELLED | OUTPATIENT
Start: 2024-10-12 | End: 2024-10-13

## 2024-10-12 RX ORDER — MISOPROSTOL 200 UG/1
800 TABLET ORAL AS NEEDED
Status: CANCELLED | OUTPATIENT
Start: 2024-10-12

## 2024-10-12 RX ORDER — SODIUM CHLORIDE 9 MG/ML
40 INJECTION, SOLUTION INTRAVENOUS AS NEEDED
Status: CANCELLED | OUTPATIENT
Start: 2024-10-12 | End: 2024-10-13

## 2024-10-12 RX ORDER — SODIUM CHLORIDE 0.9 % (FLUSH) 0.9 %
10 SYRINGE (ML) INJECTION AS NEEDED
Status: CANCELLED | OUTPATIENT
Start: 2024-10-12

## 2024-10-12 RX ORDER — SODIUM CHLORIDE 0.9 % (FLUSH) 0.9 %
10 SYRINGE (ML) INJECTION EVERY 12 HOURS SCHEDULED
Status: CANCELLED | OUTPATIENT
Start: 2024-10-12

## 2024-10-12 RX ORDER — MORPHINE SULFATE 1 MG/ML
1 INJECTION, SOLUTION EPIDURAL; INTRATHECAL; INTRAVENOUS EVERY 4 HOURS PRN
Status: CANCELLED | OUTPATIENT
Start: 2024-10-12 | End: 2024-10-17

## 2024-10-12 RX ORDER — OXYTOCIN/0.9 % SODIUM CHLORIDE 30/500 ML
2-20 PLASTIC BAG, INJECTION (ML) INTRAVENOUS
Status: CANCELLED | OUTPATIENT
Start: 2024-10-17

## 2024-10-12 RX ORDER — MAGNESIUM CARB/ALUMINUM HYDROX 105-160MG
30 TABLET,CHEWABLE ORAL ONCE
Status: CANCELLED | OUTPATIENT
Start: 2024-10-12 | End: 2024-10-12

## 2024-10-12 RX ORDER — OXYTOCIN/0.9 % SODIUM CHLORIDE 30/500 ML
250 PLASTIC BAG, INJECTION (ML) INTRAVENOUS CONTINUOUS
Status: CANCELLED | OUTPATIENT
Start: 2024-10-12 | End: 2024-10-12

## 2024-10-12 RX ORDER — MORPHINE SULFATE 2 MG/ML
2 INJECTION, SOLUTION INTRAMUSCULAR; INTRAVENOUS EVERY 4 HOURS PRN
Status: CANCELLED | OUTPATIENT
Start: 2024-10-12 | End: 2024-10-17

## 2024-10-12 RX ORDER — CARBOPROST TROMETHAMINE 250 UG/ML
250 INJECTION, SOLUTION INTRAMUSCULAR AS NEEDED
Status: CANCELLED | OUTPATIENT
Start: 2024-10-12

## 2024-10-12 RX ORDER — METHYLERGONOVINE MALEATE 0.2 MG/ML
200 INJECTION INTRAVENOUS ONCE AS NEEDED
Status: CANCELLED | OUTPATIENT
Start: 2024-10-12

## 2024-10-12 RX ORDER — ACETAMINOPHEN 325 MG/1
650 TABLET ORAL EVERY 4 HOURS PRN
Status: CANCELLED | OUTPATIENT
Start: 2024-10-12

## 2024-10-12 RX ORDER — ONDANSETRON 2 MG/ML
4 INJECTION INTRAMUSCULAR; INTRAVENOUS EVERY 6 HOURS PRN
Status: CANCELLED | OUTPATIENT
Start: 2024-10-12

## 2024-10-12 RX ORDER — LIDOCAINE HYDROCHLORIDE 10 MG/ML
0.5 INJECTION, SOLUTION EPIDURAL; INFILTRATION; INTRACAUDAL; PERINEURAL ONCE AS NEEDED
Status: CANCELLED | OUTPATIENT
Start: 2024-10-12

## 2024-10-14 ENCOUNTER — ROUTINE PRENATAL (OUTPATIENT)
Dept: OBSTETRICS AND GYNECOLOGY | Facility: CLINIC | Age: 24
End: 2024-10-14
Payer: COMMERCIAL

## 2024-10-14 VITALS — SYSTOLIC BLOOD PRESSURE: 110 MMHG | WEIGHT: 243.6 LBS | DIASTOLIC BLOOD PRESSURE: 64 MMHG | BODY MASS INDEX: 38.15 KG/M2

## 2024-10-14 DIAGNOSIS — Z34.83 MULTIGRAVIDA IN THIRD TRIMESTER: Primary | ICD-10-CM

## 2024-10-14 PROBLEM — Z3A.34 34 WEEKS GESTATION OF PREGNANCY: Status: RESOLVED | Noted: 2024-09-13 | Resolved: 2024-10-14

## 2024-10-14 PROBLEM — Z3A.36 36 WEEKS GESTATION OF PREGNANCY: Status: RESOLVED | Noted: 2024-09-28 | Resolved: 2024-10-14

## 2024-10-14 PROCEDURE — 99213 OFFICE O/P EST LOW 20 MIN: CPT | Performed by: NURSE PRACTITIONER

## 2024-10-14 NOTE — PROGRESS NOTES
OB FOLLOW UP  CC- Here for care of pregnancy        Sari Newton is a 24 y.o.  38w4d patient being seen today for her obstetrical follow up visit. Patient reports jimena lloyd.    She denies LOF, vaginal spotting, headaches, vision changes, swelling. She reports adequate fetal movements, >10 movements in 10 hours.       Her prenatal care is complicated by (and status) :   Patient Active Problem List   Diagnosis    Short interval between pregnancies affecting pregnancy, antepartum    History of postpartum depression, currently pregnant    Tobacco use during pregnancy, antepartum    Obesity in pregnancy, antepartum    Pregnancy headache in second trimester    Third trimester pregnancy    Multigravida in third trimester       GBS Status: negative   Strep Gp B NINA   Date Value Ref Range Status   2023 Negative Negative Final     Comment:     Centers for Disease Control and Prevention (CDC) and American Congress  of Obstetricians and Gynecologists (ACOG) guidelines for prevention of   group B streptococcal (GBS) disease specify co-collection of  a vaginal and rectal swab specimen to maximize sensitivity of GBS  detection. Per the CDC and ACOG, swabbing both the lower vagina and  rectum substantially increases the yield of detection compared with  sampling the vagina alone.  Penicillin G, ampicillin, or cefazolin are indicated for intrapartum  prophylaxis of  GBS colonization. Reflex susceptibility  testing should be performed prior to use of clindamycin only on GBS  isolates from penicillin-allergic women who are considered a high risk  for anaphylaxis. Treatment with vancomycin without additional testing  is warranted if resistance to clindamycin is noted.       Strep Gp B Culture   Date Value Ref Range Status   2024 Negative Negative Final     Comment:     Centers for Disease Control and Prevention (CDC) and American Congress  of Obstetricians and Gynecologists (ACOG)  guidelines for prevention of   group B streptococcal (GBS) disease specify co-collection of  a vaginal and rectal swab specimen to maximize sensitivity of GBS  detection. Per the CDC and ACOG, swabbing both the lower vagina and  rectum substantially increases the yield of detection compared with  sampling the vagina alone.  Penicillin G, ampicillin, or cefazolin are indicated for intrapartum  prophylaxis of  GBS colonization. Reflex susceptibility  testing should be performed prior to use of clindamycin only on GBS  isolates from penicillin-allergic women who are considered a high risk  for anaphylaxis. Treatment with vancomycin without additional testing  is warranted if resistance to clindamycin is noted.           No Known Allergies       Flu Status: Already given in current flu season  Her Delivery Plan is:  IOL scheduled for 10/17/2024      today: no  Non Stress Test: No.    ROS -   Patient Denies: Loss of Fluid, Vaginal Spotting, Vision Changes, Headaches, Nausea , Vomiting , Epigastric pain, and skin itching  Fetal Movement : normal  All other systems reviewed and are negative.       The additional following portions of the patient's history were reviewed and updated as appropriate: allergies, current medications, past family history, past medical history, past social history, past surgical history, and problem list.    I have reviewed and agree with the HPI, ROS, and historical information as entered above. Haley Wesley, APRN        EXAM:     Prenatal Vitals  BP: 110/64  Weight: 110 kg (243 lb 9.6 oz)   Fetal Heart Rate: 141       Dilation/Effacement/Station  Dilation: 2  Effacement (%): 50  Station: -2                  Assessment and Plan    Problem List Items Addressed This Visit          Gravid and     Multigravida in third trimester - Primary     Membrane sweep today    Pregnancy at 38w4d  Fetal status reassuring.   Reviewed upcoming IOL with patient. Instructions  given.  Delivery options reviewed with patient  Signs of labor reviewed  Kick counts reviewed  Activity and Exercise discussed.  Return for IOL 10/17 0000.    Haley Wesley, APRN  10/14/2024

## 2024-10-17 ENCOUNTER — ANESTHESIA EVENT (OUTPATIENT)
Dept: LABOR AND DELIVERY | Facility: HOSPITAL | Age: 24
End: 2024-10-17
Payer: COMMERCIAL

## 2024-10-17 ENCOUNTER — HOSPITAL ENCOUNTER (OUTPATIENT)
Dept: LABOR AND DELIVERY | Facility: HOSPITAL | Age: 24
Discharge: HOME | End: 2024-10-17
Payer: COMMERCIAL

## 2024-10-17 ENCOUNTER — HOSPITAL ENCOUNTER (INPATIENT)
Facility: HOSPITAL | Age: 24
LOS: 2 days | Discharge: HOME | End: 2024-10-19
Attending: OBSTETRICS & GYNECOLOGY | Admitting: OBSTETRICS & GYNECOLOGY
Payer: COMMERCIAL

## 2024-10-17 ENCOUNTER — ANESTHESIA (OUTPATIENT)
Dept: LABOR AND DELIVERY | Facility: HOSPITAL | Age: 24
End: 2024-10-17
Payer: COMMERCIAL

## 2024-10-17 DIAGNOSIS — Z98.891 S/P C-SECTION: Primary | ICD-10-CM

## 2024-10-17 PROBLEM — Z34.90 PREGNANT: Status: ACTIVE | Noted: 2024-10-17

## 2024-10-17 LAB
ABO GROUP BLD: NORMAL
ALBUMIN SERPL-MCNC: 3.3 G/DL (ref 3.5–5.2)
ALBUMIN/GLOB SERPL: 1.1 G/DL
ALP SERPL-CCNC: 159 U/L (ref 39–117)
ALT SERPL W P-5'-P-CCNC: 9 U/L (ref 1–33)
ANION GAP SERPL CALCULATED.3IONS-SCNC: 11 MMOL/L (ref 5–15)
AST SERPL-CCNC: 15 U/L (ref 1–32)
ATMOSPHERIC PRESS: ABNORMAL MM[HG]
ATMOSPHERIC PRESS: ABNORMAL MM[HG]
BASE EXCESS BLDCOA CALC-SCNC: -3.7 MMOL/L (ref 0–2)
BASE EXCESS BLDCOV CALC-SCNC: -3.4 MMOL/L (ref 0–2)
BDY SITE: ABNORMAL
BDY SITE: ABNORMAL
BILIRUB SERPL-MCNC: 0.2 MG/DL (ref 0–1.2)
BLD GP AB SCN SERPL QL: NEGATIVE
BODY TEMPERATURE: 37
BODY TEMPERATURE: 37
BUN SERPL-MCNC: 5 MG/DL (ref 6–20)
BUN/CREAT SERPL: 10.9 (ref 7–25)
CALCIUM SPEC-SCNC: 8.4 MG/DL (ref 8.6–10.5)
CHLORIDE SERPL-SCNC: 105 MMOL/L (ref 98–107)
CO2 BLDA-SCNC: 23.9 MMOL/L (ref 22–33)
CO2 BLDA-SCNC: 29.1 MMOL/L (ref 22–33)
CO2 SERPL-SCNC: 20 MMOL/L (ref 22–29)
CREAT SERPL-MCNC: 0.46 MG/DL (ref 0.57–1)
CREAT UR-MCNC: 209.8 MG/DL
DEPRECATED RDW RBC AUTO: 49 FL (ref 37–54)
EGFRCR SERPLBLD CKD-EPI 2021: 137.2 ML/MIN/1.73
EPAP: 0
EPAP: 0
ERYTHROCYTE [DISTWIDTH] IN BLOOD BY AUTOMATED COUNT: 14 % (ref 12.3–15.4)
GLOBULIN UR ELPH-MCNC: 3 GM/DL
GLUCOSE SERPL-MCNC: 91 MG/DL (ref 65–99)
HCO3 BLDCOA-SCNC: 26.9 MMOL/L (ref 16.9–20.5)
HCO3 BLDCOV-SCNC: 22.6 MMOL/L (ref 18.6–21.4)
HCT VFR BLD AUTO: 35 % (ref 34–46.6)
HGB BLD-MCNC: 11.6 G/DL (ref 12–15.9)
HGB BLDA-MCNC: 17.2 G/DL (ref 14–18)
HGB BLDA-MCNC: 18.1 G/DL (ref 14–18)
INHALED O2 CONCENTRATION: 21 %
INHALED O2 CONCENTRATION: 21 %
IPAP: 0
IPAP: 0
LDH SERPL-CCNC: 188 U/L (ref 135–214)
Lab: ABNORMAL
MCH RBC QN AUTO: 31.7 PG (ref 26.6–33)
MCHC RBC AUTO-ENTMCNC: 33.1 G/DL (ref 31.5–35.7)
MCV RBC AUTO: 95.6 FL (ref 79–97)
MODALITY: ABNORMAL
MODALITY: ABNORMAL
NOTIFIED BY: ABNORMAL
NOTIFIED WHO: ABNORMAL
PAW @ PEAK INSP FLOW SETTING VENT: 0 CMH2O
PAW @ PEAK INSP FLOW SETTING VENT: 0 CMH2O
PCO2 BLDCOA: 71.4 MMHG (ref 43.3–54.9)
PCO2 BLDCOV: 42.9 MM HG (ref 28–40)
PH BLDCOA: 7.19 PH UNITS (ref 7.22–7.3)
PH BLDCOV: 7.33 PH UNITS (ref 7.31–7.37)
PLATELET # BLD AUTO: 188 10*3/MM3 (ref 140–450)
PMV BLD AUTO: 11 FL (ref 6–12)
PO2 BLDCOA: 6 MMHG (ref 11.5–43.3)
PO2 BLDCOV: 35.7 MM HG (ref 21–31)
POTASSIUM SERPL-SCNC: 3.9 MMOL/L (ref 3.5–5.2)
PROT ?TM UR-MCNC: 22.5 MG/DL
PROT SERPL-MCNC: 6.3 G/DL (ref 6–8.5)
PROT/CREAT UR: 107.2 MG/G CREA (ref 0–200)
RBC # BLD AUTO: 3.66 10*6/MM3 (ref 3.77–5.28)
RH BLD: POSITIVE
SAO2 % BLDCOA: ABNORMAL %
SODIUM SERPL-SCNC: 136 MMOL/L (ref 136–145)
T&S EXPIRATION DATE: NORMAL
TOTAL RATE: 0 BREATHS/MINUTE
TOTAL RATE: 0 BREATHS/MINUTE
TREPONEMA PALLIDUM IGG+IGM AB [PRESENCE] IN SERUM OR PLASMA BY IMMUNOASSAY: NORMAL
URATE SERPL-MCNC: 3.1 MG/DL (ref 2.4–5.7)
VENTILATOR MODE: ABNORMAL
WBC NRBC COR # BLD AUTO: 13.01 10*3/MM3 (ref 3.4–10.8)

## 2024-10-17 PROCEDURE — 82805 BLOOD GASES W/O2 SATURATION: CPT

## 2024-10-17 PROCEDURE — 25010000002 CEFAZOLIN PER 500 MG

## 2024-10-17 PROCEDURE — 36415 COLL VENOUS BLD VENIPUNCTURE: CPT | Performed by: OBSTETRICS & GYNECOLOGY

## 2024-10-17 PROCEDURE — 84156 ASSAY OF PROTEIN URINE: CPT | Performed by: OBSTETRICS & GYNECOLOGY

## 2024-10-17 PROCEDURE — 59025 FETAL NON-STRESS TEST: CPT

## 2024-10-17 PROCEDURE — 80053 COMPREHEN METABOLIC PANEL: CPT | Performed by: OBSTETRICS & GYNECOLOGY

## 2024-10-17 PROCEDURE — 82570 ASSAY OF URINE CREATININE: CPT | Performed by: OBSTETRICS & GYNECOLOGY

## 2024-10-17 PROCEDURE — 83615 LACTATE (LD) (LDH) ENZYME: CPT | Performed by: OBSTETRICS & GYNECOLOGY

## 2024-10-17 PROCEDURE — 25010000002 LIDOCAINE-EPINEPHRINE 2 %-1:200000 SOLUTION: Performed by: NURSE ANESTHETIST, CERTIFIED REGISTERED

## 2024-10-17 PROCEDURE — 86901 BLOOD TYPING SEROLOGIC RH(D): CPT | Performed by: OBSTETRICS & GYNECOLOGY

## 2024-10-17 PROCEDURE — 25010000002 ONDANSETRON PER 1 MG: Performed by: NURSE ANESTHETIST, CERTIFIED REGISTERED

## 2024-10-17 PROCEDURE — 86780 TREPONEMA PALLIDUM: CPT | Performed by: OBSTETRICS & GYNECOLOGY

## 2024-10-17 PROCEDURE — 0UB70ZZ EXCISION OF BILATERAL FALLOPIAN TUBES, OPEN APPROACH: ICD-10-PCS | Performed by: OBSTETRICS & GYNECOLOGY

## 2024-10-17 PROCEDURE — C1755 CATHETER, INTRASPINAL: HCPCS | Performed by: ANESTHESIOLOGY

## 2024-10-17 PROCEDURE — C1755 CATHETER, INTRASPINAL: HCPCS

## 2024-10-17 PROCEDURE — 25010000002 METOCLOPRAMIDE PER 10 MG: Performed by: NURSE ANESTHETIST, CERTIFIED REGISTERED

## 2024-10-17 PROCEDURE — 25810000003 LACTATED RINGERS PER 1000 ML: Performed by: OBSTETRICS & GYNECOLOGY

## 2024-10-17 PROCEDURE — 86900 BLOOD TYPING SEROLOGIC ABO: CPT | Performed by: OBSTETRICS & GYNECOLOGY

## 2024-10-17 PROCEDURE — 84550 ASSAY OF BLOOD/URIC ACID: CPT | Performed by: OBSTETRICS & GYNECOLOGY

## 2024-10-17 PROCEDURE — 51702 INSERT TEMP BLADDER CATH: CPT

## 2024-10-17 PROCEDURE — 25010000002 MIDAZOLAM PER 1 MG: Performed by: NURSE ANESTHETIST, CERTIFIED REGISTERED

## 2024-10-17 PROCEDURE — 25010000002 MORPHINE PER 10 MG: Performed by: NURSE ANESTHETIST, CERTIFIED REGISTERED

## 2024-10-17 PROCEDURE — C1765 ADHESION BARRIER: HCPCS | Performed by: OBSTETRICS & GYNECOLOGY

## 2024-10-17 PROCEDURE — 25010000002 FENTANYL CITRATE (PF) 50 MCG/ML SOLUTION: Performed by: ANESTHESIOLOGY

## 2024-10-17 PROCEDURE — 25010000002 LIDOCAINE-EPINEPHRINE 1.5 %-1:200000 SOLUTION: Performed by: ANESTHESIOLOGY

## 2024-10-17 PROCEDURE — 25010000002 BUPIVACAINE (PF) 0.25 % SOLUTION: Performed by: ANESTHESIOLOGY

## 2024-10-17 PROCEDURE — 85027 COMPLETE CBC AUTOMATED: CPT | Performed by: OBSTETRICS & GYNECOLOGY

## 2024-10-17 PROCEDURE — 25010000002 KETOROLAC TROMETHAMINE PER 15 MG: Performed by: OBSTETRICS & GYNECOLOGY

## 2024-10-17 PROCEDURE — 25010000002 ROPIVACAINE PER 1 MG: Performed by: ANESTHESIOLOGY

## 2024-10-17 PROCEDURE — 86850 RBC ANTIBODY SCREEN: CPT | Performed by: OBSTETRICS & GYNECOLOGY

## 2024-10-17 PROCEDURE — 88302 TISSUE EXAM BY PATHOLOGIST: CPT | Performed by: OBSTETRICS & GYNECOLOGY

## 2024-10-17 DEVICE — ABSORBABLE ADHESION BARRIER
Type: IMPLANTABLE DEVICE | Site: ABDOMEN | Status: FUNCTIONAL
Brand: GYNECARE INTERCEED

## 2024-10-17 RX ORDER — CALCIUM CARBONATE 500 MG/1
1 TABLET, CHEWABLE ORAL EVERY 4 HOURS PRN
Status: DISCONTINUED | OUTPATIENT
Start: 2024-10-17 | End: 2024-10-19 | Stop reason: HOSPADM

## 2024-10-17 RX ORDER — ONDANSETRON 2 MG/ML
INJECTION INTRAMUSCULAR; INTRAVENOUS AS NEEDED
Status: DISCONTINUED | OUTPATIENT
Start: 2024-10-17 | End: 2024-10-17

## 2024-10-17 RX ORDER — ACETAMINOPHEN 325 MG/1
650 TABLET ORAL EVERY 6 HOURS
Status: DISCONTINUED | OUTPATIENT
Start: 2024-10-18 | End: 2024-10-19 | Stop reason: HOSPADM

## 2024-10-17 RX ORDER — MORPHINE SULFATE 0.5 MG/ML
INJECTION, SOLUTION EPIDURAL; INTRATHECAL; INTRAVENOUS AS NEEDED
Status: DISCONTINUED | OUTPATIENT
Start: 2024-10-17 | End: 2024-10-17 | Stop reason: SURG

## 2024-10-17 RX ORDER — SODIUM CHLORIDE 9 MG/ML
INJECTION, SOLUTION INTRAVENOUS
Status: DISCONTINUED
Start: 2024-10-17 | End: 2024-10-19 | Stop reason: HOSPADM

## 2024-10-17 RX ORDER — IBUPROFEN 600 MG/1
600 TABLET, FILM COATED ORAL EVERY 6 HOURS
Status: DISCONTINUED | OUTPATIENT
Start: 2024-10-18 | End: 2024-10-19 | Stop reason: HOSPADM

## 2024-10-17 RX ORDER — IBUPROFEN 600 MG/1
600 TABLET, FILM COATED ORAL EVERY 6 HOURS PRN
Status: DISCONTINUED | OUTPATIENT
Start: 2024-10-17 | End: 2024-10-17 | Stop reason: HOSPADM

## 2024-10-17 RX ORDER — SODIUM CHLORIDE 0.9 % (FLUSH) 0.9 %
10 SYRINGE (ML) INJECTION EVERY 12 HOURS SCHEDULED
Status: DISCONTINUED | OUTPATIENT
Start: 2024-10-17 | End: 2024-10-17 | Stop reason: HOSPADM

## 2024-10-17 RX ORDER — METOCLOPRAMIDE HYDROCHLORIDE 5 MG/ML
10 INJECTION INTRAMUSCULAR; INTRAVENOUS ONCE AS NEEDED
Status: DISCONTINUED | OUTPATIENT
Start: 2024-10-17 | End: 2024-10-17 | Stop reason: HOSPADM

## 2024-10-17 RX ORDER — KETOROLAC TROMETHAMINE 15 MG/ML
15 INJECTION, SOLUTION INTRAMUSCULAR; INTRAVENOUS EVERY 6 HOURS
Status: ACTIVE | OUTPATIENT
Start: 2024-10-17 | End: 2024-10-18

## 2024-10-17 RX ORDER — OXYTOCIN/0.9 % SODIUM CHLORIDE 30/500 ML
PLASTIC BAG, INJECTION (ML) INTRAVENOUS AS NEEDED
Status: DISCONTINUED | OUTPATIENT
Start: 2024-10-17 | End: 2024-10-17 | Stop reason: SURG

## 2024-10-17 RX ORDER — ACETAMINOPHEN 325 MG/1
650 TABLET ORAL EVERY 4 HOURS PRN
Status: DISCONTINUED | OUTPATIENT
Start: 2024-10-17 | End: 2024-10-17 | Stop reason: HOSPADM

## 2024-10-17 RX ORDER — SODIUM CHLORIDE 9 MG/ML
40 INJECTION, SOLUTION INTRAVENOUS AS NEEDED
Status: DISCONTINUED | OUTPATIENT
Start: 2024-10-17 | End: 2024-10-17 | Stop reason: HOSPADM

## 2024-10-17 RX ORDER — ONDANSETRON 2 MG/ML
4 INJECTION INTRAMUSCULAR; INTRAVENOUS ONCE AS NEEDED
Status: ACTIVE | OUTPATIENT
Start: 2024-10-17 | End: 2024-10-18

## 2024-10-17 RX ORDER — CARBOPROST TROMETHAMINE 250 UG/ML
250 INJECTION, SOLUTION INTRAMUSCULAR AS NEEDED
Status: DISCONTINUED | OUTPATIENT
Start: 2024-10-17 | End: 2024-10-17 | Stop reason: HOSPADM

## 2024-10-17 RX ORDER — LIDOCAINE HYDROCHLORIDE 10 MG/ML
0.5 INJECTION, SOLUTION EPIDURAL; INFILTRATION; INTRACAUDAL; PERINEURAL ONCE AS NEEDED
Status: DISCONTINUED | OUTPATIENT
Start: 2024-10-17 | End: 2024-10-17 | Stop reason: HOSPADM

## 2024-10-17 RX ORDER — ROPIVACAINE HYDROCHLORIDE 2 MG/ML
15 INJECTION, SOLUTION EPIDURAL; INFILTRATION; PERINEURAL CONTINUOUS
Status: DISCONTINUED | OUTPATIENT
Start: 2024-10-17 | End: 2024-10-19 | Stop reason: HOSPADM

## 2024-10-17 RX ORDER — DIPHENHYDRAMINE HCL 25 MG
25 CAPSULE ORAL EVERY 4 HOURS PRN
Status: DISCONTINUED | OUTPATIENT
Start: 2024-10-17 | End: 2024-10-19 | Stop reason: HOSPADM

## 2024-10-17 RX ORDER — OXYTOCIN/0.9 % SODIUM CHLORIDE 30/500 ML
2-20 PLASTIC BAG, INJECTION (ML) INTRAVENOUS
Status: DISCONTINUED | OUTPATIENT
Start: 2024-10-17 | End: 2024-10-17 | Stop reason: HOSPADM

## 2024-10-17 RX ORDER — MISOPROSTOL 200 UG/1
800 TABLET ORAL AS NEEDED
Status: DISCONTINUED | OUTPATIENT
Start: 2024-10-17 | End: 2024-10-17 | Stop reason: HOSPADM

## 2024-10-17 RX ORDER — NALOXONE HCL 0.4 MG/ML
0.4 VIAL (ML) INJECTION
Status: DISCONTINUED | OUTPATIENT
Start: 2024-10-17 | End: 2024-10-17 | Stop reason: HOSPADM

## 2024-10-17 RX ORDER — ALUMINA, MAGNESIA, AND SIMETHICONE 2400; 2400; 240 MG/30ML; MG/30ML; MG/30ML
15 SUSPENSION ORAL EVERY 4 HOURS PRN
Status: DISCONTINUED | OUTPATIENT
Start: 2024-10-17 | End: 2024-10-19 | Stop reason: HOSPADM

## 2024-10-17 RX ORDER — OXYTOCIN/0.9 % SODIUM CHLORIDE 30/500 ML
125 PLASTIC BAG, INJECTION (ML) INTRAVENOUS ONCE AS NEEDED
Status: DISCONTINUED | OUTPATIENT
Start: 2024-10-17 | End: 2024-10-19 | Stop reason: HOSPADM

## 2024-10-17 RX ORDER — ONDANSETRON 4 MG/1
4 TABLET, ORALLY DISINTEGRATING ORAL EVERY 6 HOURS PRN
Status: DISCONTINUED | OUTPATIENT
Start: 2024-10-17 | End: 2024-10-17 | Stop reason: HOSPADM

## 2024-10-17 RX ORDER — METOCLOPRAMIDE HYDROCHLORIDE 5 MG/ML
INJECTION INTRAMUSCULAR; INTRAVENOUS AS NEEDED
Status: DISCONTINUED | OUTPATIENT
Start: 2024-10-17 | End: 2024-10-17 | Stop reason: SURG

## 2024-10-17 RX ORDER — SODIUM CHLORIDE 0.9 % (FLUSH) 0.9 %
10 SYRINGE (ML) INJECTION EVERY 12 HOURS SCHEDULED
Status: DISCONTINUED | OUTPATIENT
Start: 2024-10-17 | End: 2024-10-19 | Stop reason: HOSPADM

## 2024-10-17 RX ORDER — SODIUM CHLORIDE, SODIUM LACTATE, POTASSIUM CHLORIDE, CALCIUM CHLORIDE 600; 310; 30; 20 MG/100ML; MG/100ML; MG/100ML; MG/100ML
125 INJECTION, SOLUTION INTRAVENOUS CONTINUOUS
Status: ACTIVE | OUTPATIENT
Start: 2024-10-17 | End: 2024-10-18

## 2024-10-17 RX ORDER — NALOXONE HCL 0.4 MG/ML
0.4 VIAL (ML) INJECTION
Status: ACTIVE | OUTPATIENT
Start: 2024-10-17 | End: 2024-10-18

## 2024-10-17 RX ORDER — SODIUM CHLORIDE 0.9 % (FLUSH) 0.9 %
1-10 SYRINGE (ML) INJECTION AS NEEDED
Status: DISCONTINUED | OUTPATIENT
Start: 2024-10-17 | End: 2024-10-19 | Stop reason: HOSPADM

## 2024-10-17 RX ORDER — KETOROLAC TROMETHAMINE 30 MG/ML
30 INJECTION, SOLUTION INTRAMUSCULAR; INTRAVENOUS EVERY 6 HOURS
Status: DISCONTINUED | OUTPATIENT
Start: 2024-10-17 | End: 2024-10-17

## 2024-10-17 RX ORDER — DOCUSATE SODIUM 100 MG/1
100 CAPSULE, LIQUID FILLED ORAL 2 TIMES DAILY PRN
Status: DISCONTINUED | OUTPATIENT
Start: 2024-10-17 | End: 2024-10-19 | Stop reason: HOSPADM

## 2024-10-17 RX ORDER — MIDAZOLAM HYDROCHLORIDE 1 MG/ML
INJECTION INTRAMUSCULAR; INTRAVENOUS AS NEEDED
Status: DISCONTINUED | OUTPATIENT
Start: 2024-10-17 | End: 2024-10-17 | Stop reason: SURG

## 2024-10-17 RX ORDER — LIDOCAINE HYDROCHLORIDE AND EPINEPHRINE 15; 5 MG/ML; UG/ML
INJECTION, SOLUTION EPIDURAL AS NEEDED
Status: DISCONTINUED | OUTPATIENT
Start: 2024-10-17 | End: 2024-10-17 | Stop reason: SURG

## 2024-10-17 RX ORDER — MORPHINE SULFATE 2 MG/ML
2 INJECTION, SOLUTION INTRAMUSCULAR; INTRAVENOUS EVERY 4 HOURS PRN
Status: DISCONTINUED | OUTPATIENT
Start: 2024-10-17 | End: 2024-10-17 | Stop reason: HOSPADM

## 2024-10-17 RX ORDER — CARBOPROST TROMETHAMINE 250 UG/ML
250 INJECTION, SOLUTION INTRAMUSCULAR AS NEEDED
Status: DISCONTINUED | OUTPATIENT
Start: 2024-10-17 | End: 2024-10-19 | Stop reason: HOSPADM

## 2024-10-17 RX ORDER — METHYLERGONOVINE MALEATE 0.2 MG/ML
200 INJECTION INTRAVENOUS AS NEEDED
Status: DISCONTINUED | OUTPATIENT
Start: 2024-10-17 | End: 2024-10-19 | Stop reason: HOSPADM

## 2024-10-17 RX ORDER — HYDROCORTISONE 25 MG/G
1 CREAM TOPICAL AS NEEDED
Status: DISCONTINUED | OUTPATIENT
Start: 2024-10-17 | End: 2024-10-19 | Stop reason: HOSPADM

## 2024-10-17 RX ORDER — DIPHENHYDRAMINE HYDROCHLORIDE 50 MG/ML
12.5 INJECTION INTRAMUSCULAR; INTRAVENOUS EVERY 8 HOURS PRN
Status: DISCONTINUED | OUTPATIENT
Start: 2024-10-17 | End: 2024-10-17

## 2024-10-17 RX ORDER — SODIUM CHLORIDE 0.9 % (FLUSH) 0.9 %
10 SYRINGE (ML) INJECTION AS NEEDED
Status: DISCONTINUED | OUTPATIENT
Start: 2024-10-17 | End: 2024-10-17 | Stop reason: HOSPADM

## 2024-10-17 RX ORDER — DIPHENHYDRAMINE HYDROCHLORIDE 50 MG/ML
25 INJECTION INTRAMUSCULAR; INTRAVENOUS ONCE AS NEEDED
Status: DISCONTINUED | OUTPATIENT
Start: 2024-10-17 | End: 2024-10-19 | Stop reason: HOSPADM

## 2024-10-17 RX ORDER — ONDANSETRON 4 MG/1
4 TABLET, ORALLY DISINTEGRATING ORAL EVERY 8 HOURS PRN
Status: DISCONTINUED | OUTPATIENT
Start: 2024-10-17 | End: 2024-10-19 | Stop reason: HOSPADM

## 2024-10-17 RX ORDER — ACETAMINOPHEN 500 MG
1000 TABLET ORAL EVERY 6 HOURS
Status: COMPLETED | OUTPATIENT
Start: 2024-10-17 | End: 2024-10-18

## 2024-10-17 RX ORDER — SODIUM CHLORIDE 9 MG/ML
40 INJECTION, SOLUTION INTRAVENOUS AS NEEDED
Status: ACTIVE | OUTPATIENT
Start: 2024-10-17 | End: 2024-10-18

## 2024-10-17 RX ORDER — KETOROLAC TROMETHAMINE 30 MG/ML
30 INJECTION, SOLUTION INTRAMUSCULAR; INTRAVENOUS EVERY 6 HOURS PRN
Status: DISCONTINUED | OUTPATIENT
Start: 2024-10-17 | End: 2024-10-17

## 2024-10-17 RX ORDER — ONDANSETRON 2 MG/ML
4 INJECTION INTRAMUSCULAR; INTRAVENOUS EVERY 6 HOURS PRN
Status: DISCONTINUED | OUTPATIENT
Start: 2024-10-17 | End: 2024-10-17 | Stop reason: HOSPADM

## 2024-10-17 RX ORDER — EPHEDRINE SULFATE 5 MG/ML
10 INJECTION INTRAVENOUS
Status: DISCONTINUED | OUTPATIENT
Start: 2024-10-17 | End: 2024-10-17 | Stop reason: HOSPADM

## 2024-10-17 RX ORDER — FAMOTIDINE 10 MG/ML
20 INJECTION, SOLUTION INTRAVENOUS ONCE AS NEEDED
Status: COMPLETED | OUTPATIENT
Start: 2024-10-17 | End: 2024-10-17

## 2024-10-17 RX ORDER — FENTANYL CITRATE 50 UG/ML
INJECTION, SOLUTION INTRAMUSCULAR; INTRAVENOUS AS NEEDED
Status: DISCONTINUED | OUTPATIENT
Start: 2024-10-17 | End: 2024-10-17 | Stop reason: SURG

## 2024-10-17 RX ORDER — OXYCODONE HYDROCHLORIDE 10 MG/1
10 TABLET ORAL EVERY 4 HOURS PRN
Status: DISCONTINUED | OUTPATIENT
Start: 2024-10-17 | End: 2024-10-19 | Stop reason: HOSPADM

## 2024-10-17 RX ORDER — ONDANSETRON 2 MG/ML
4 INJECTION INTRAMUSCULAR; INTRAVENOUS ONCE AS NEEDED
Status: DISCONTINUED | OUTPATIENT
Start: 2024-10-17 | End: 2024-10-17 | Stop reason: HOSPADM

## 2024-10-17 RX ORDER — CITRIC ACID/SODIUM CITRATE 334-500MG
30 SOLUTION, ORAL ORAL ONCE
Status: COMPLETED | OUTPATIENT
Start: 2024-10-17 | End: 2024-10-17

## 2024-10-17 RX ORDER — OXYTOCIN/0.9 % SODIUM CHLORIDE 30/500 ML
999 PLASTIC BAG, INJECTION (ML) INTRAVENOUS ONCE
Status: DISCONTINUED | OUTPATIENT
Start: 2024-10-17 | End: 2024-10-17 | Stop reason: HOSPADM

## 2024-10-17 RX ORDER — METHYLERGONOVINE MALEATE 0.2 MG/ML
200 INJECTION INTRAVENOUS ONCE AS NEEDED
Status: DISCONTINUED | OUTPATIENT
Start: 2024-10-17 | End: 2024-10-17 | Stop reason: HOSPADM

## 2024-10-17 RX ORDER — ACETAMINOPHEN 325 MG/1
650 TABLET ORAL EVERY 4 HOURS PRN
Status: DISCONTINUED | OUTPATIENT
Start: 2024-10-17 | End: 2024-10-17

## 2024-10-17 RX ORDER — LIDOCAINE HCL/EPINEPHRINE/PF 2%-1:200K
VIAL (ML) INJECTION AS NEEDED
Status: DISCONTINUED | OUTPATIENT
Start: 2024-10-17 | End: 2024-10-17 | Stop reason: SURG

## 2024-10-17 RX ORDER — MORPHINE SULFATE 2 MG/ML
1 INJECTION, SOLUTION INTRAMUSCULAR; INTRAVENOUS EVERY 4 HOURS PRN
Status: DISCONTINUED | OUTPATIENT
Start: 2024-10-17 | End: 2024-10-17 | Stop reason: HOSPADM

## 2024-10-17 RX ORDER — EPHEDRINE SULFATE 5 MG/ML
INJECTION INTRAVENOUS
Status: DISCONTINUED
Start: 2024-10-17 | End: 2024-10-19 | Stop reason: HOSPADM

## 2024-10-17 RX ORDER — FAMOTIDINE 10 MG/ML
INJECTION, SOLUTION INTRAVENOUS AS NEEDED
Status: DISCONTINUED | OUTPATIENT
Start: 2024-10-17 | End: 2024-10-17 | Stop reason: SURG

## 2024-10-17 RX ORDER — MISOPROSTOL 200 UG/1
600 TABLET ORAL AS NEEDED
Status: DISCONTINUED | OUTPATIENT
Start: 2024-10-17 | End: 2024-10-19 | Stop reason: HOSPADM

## 2024-10-17 RX ORDER — PRENATAL VIT/IRON FUM/FOLIC AC 27MG-0.8MG
1 TABLET ORAL DAILY
Status: DISCONTINUED | OUTPATIENT
Start: 2024-10-18 | End: 2024-10-19 | Stop reason: HOSPADM

## 2024-10-17 RX ORDER — SIMETHICONE 80 MG
80 TABLET,CHEWABLE ORAL 4 TIMES DAILY PRN
Status: DISCONTINUED | OUTPATIENT
Start: 2024-10-17 | End: 2024-10-19 | Stop reason: HOSPADM

## 2024-10-17 RX ORDER — BUPIVACAINE HYDROCHLORIDE 2.5 MG/ML
INJECTION, SOLUTION EPIDURAL; INFILTRATION; INTRACAUDAL AS NEEDED
Status: DISCONTINUED | OUTPATIENT
Start: 2024-10-17 | End: 2024-10-17 | Stop reason: SURG

## 2024-10-17 RX ORDER — DIPHENHYDRAMINE HYDROCHLORIDE 50 MG/ML
25 INJECTION INTRAMUSCULAR; INTRAVENOUS EVERY 4 HOURS PRN
Status: DISCONTINUED | OUTPATIENT
Start: 2024-10-17 | End: 2024-10-19 | Stop reason: HOSPADM

## 2024-10-17 RX ORDER — MAGNESIUM CARB/ALUMINUM HYDROX 105-160MG
30 TABLET,CHEWABLE ORAL ONCE
Status: DISCONTINUED | OUTPATIENT
Start: 2024-10-17 | End: 2024-10-17 | Stop reason: HOSPADM

## 2024-10-17 RX ORDER — OXYTOCIN/0.9 % SODIUM CHLORIDE 30/500 ML
250 PLASTIC BAG, INJECTION (ML) INTRAVENOUS CONTINUOUS
Status: ACTIVE | OUTPATIENT
Start: 2024-10-17 | End: 2024-10-17

## 2024-10-17 RX ORDER — OXYCODONE HYDROCHLORIDE 5 MG/1
5 TABLET ORAL EVERY 4 HOURS PRN
Status: DISCONTINUED | OUTPATIENT
Start: 2024-10-17 | End: 2024-10-19 | Stop reason: HOSPADM

## 2024-10-17 RX ADMIN — ACETAMINOPHEN 1000 MG: 500 TABLET ORAL at 13:45

## 2024-10-17 RX ADMIN — LIDOCAINE HYDROCHLORIDE,EPINEPHRINE BITARTRATE 10 ML: 20; .005 INJECTION, SOLUTION EPIDURAL; INFILTRATION; INTRACAUDAL; PERINEURAL at 07:40

## 2024-10-17 RX ADMIN — METOCLOPRAMIDE HYDROCHLORIDE 10 MG: 10 INJECTION, SOLUTION INTRAMUSCULAR; INTRAVENOUS at 08:01

## 2024-10-17 RX ADMIN — FAMOTIDINE 20 MG: 10 INJECTION, SOLUTION INTRAVENOUS at 05:17

## 2024-10-17 RX ADMIN — Medication 2 MILLI-UNITS/MIN: at 02:31

## 2024-10-17 RX ADMIN — MIDAZOLAM HYDROCHLORIDE 1 MG: 1 INJECTION, SOLUTION INTRAMUSCULAR; INTRAVENOUS at 07:51

## 2024-10-17 RX ADMIN — KETOROLAC TROMETHAMINE 30 MG: 30 INJECTION, SOLUTION INTRAMUSCULAR; INTRAVENOUS at 09:36

## 2024-10-17 RX ADMIN — SODIUM CHLORIDE, POTASSIUM CHLORIDE, SODIUM LACTATE AND CALCIUM CHLORIDE: 600; 310; 30; 20 INJECTION, SOLUTION INTRAVENOUS at 08:20

## 2024-10-17 RX ADMIN — SODIUM CHLORIDE, POTASSIUM CHLORIDE, SODIUM LACTATE AND CALCIUM CHLORIDE 125 ML/HR: 600; 310; 30; 20 INJECTION, SOLUTION INTRAVENOUS at 02:15

## 2024-10-17 RX ADMIN — KETOROLAC TROMETHAMINE 15 MG: 15 INJECTION, SOLUTION INTRAMUSCULAR; INTRAVENOUS at 22:40

## 2024-10-17 RX ADMIN — LIDOCAINE HYDROCHLORIDE AND EPINEPHRINE 3 ML: 15; 5 INJECTION, SOLUTION EPIDURAL at 03:34

## 2024-10-17 RX ADMIN — FENTANYL CITRATE 100 MCG: 50 INJECTION, SOLUTION INTRAMUSCULAR; INTRAVENOUS at 03:36

## 2024-10-17 RX ADMIN — SODIUM CHLORIDE, POTASSIUM CHLORIDE, SODIUM LACTATE AND CALCIUM CHLORIDE 125 ML/HR: 600; 310; 30; 20 INJECTION, SOLUTION INTRAVENOUS at 09:24

## 2024-10-17 RX ADMIN — SIMETHICONE 80 MG: 80 TABLET, CHEWABLE ORAL at 20:09

## 2024-10-17 RX ADMIN — SODIUM CITRATE AND CITRIC ACID MONOHYDRATE 30 ML: 500; 334 SOLUTION ORAL at 07:40

## 2024-10-17 RX ADMIN — KETOROLAC TROMETHAMINE 15 MG: 15 INJECTION, SOLUTION INTRAMUSCULAR; INTRAVENOUS at 17:39

## 2024-10-17 RX ADMIN — FENTANYL CITRATE 50 MCG: 50 INJECTION, SOLUTION INTRAMUSCULAR; INTRAVENOUS at 08:05

## 2024-10-17 RX ADMIN — FAMOTIDINE 20 MG: 10 INJECTION, SOLUTION INTRAVENOUS at 08:01

## 2024-10-17 RX ADMIN — MORPHINE SULFATE 3 MG: 0.5 INJECTION, SOLUTION EPIDURAL; INTRATHECAL; INTRAVENOUS at 07:51

## 2024-10-17 RX ADMIN — ROPIVACAINE HYDROCHLORIDE 14 ML/HR: 2 INJECTION, SOLUTION EPIDURAL; INFILTRATION at 03:41

## 2024-10-17 RX ADMIN — Medication 500 ML: at 08:20

## 2024-10-17 RX ADMIN — BUPIVACAINE HYDROCHLORIDE 10 ML: 2.5 INJECTION, SOLUTION EPIDURAL; INFILTRATION; INTRACAUDAL; PERINEURAL at 03:37

## 2024-10-17 RX ADMIN — ACETAMINOPHEN 1000 MG: 500 TABLET ORAL at 20:09

## 2024-10-17 RX ADMIN — CEFAZOLIN 2000 MG: 2 INJECTION, POWDER, LYOPHILIZED, FOR SOLUTION INTRAVENOUS at 07:41

## 2024-10-17 RX ADMIN — MIDAZOLAM HYDROCHLORIDE 1 MG: 1 INJECTION, SOLUTION INTRAMUSCULAR; INTRAVENOUS at 08:05

## 2024-10-17 RX ADMIN — ONDANSETRON 4 MG: 2 INJECTION INTRAMUSCULAR; INTRAVENOUS at 08:01

## 2024-10-17 RX ADMIN — SODIUM CHLORIDE, POTASSIUM CHLORIDE, SODIUM LACTATE AND CALCIUM CHLORIDE 125 ML/HR: 600; 310; 30; 20 INJECTION, SOLUTION INTRAVENOUS at 03:51

## 2024-10-17 RX ADMIN — FENTANYL CITRATE 50 MCG: 50 INJECTION, SOLUTION INTRAMUSCULAR; INTRAVENOUS at 07:51

## 2024-10-17 RX ADMIN — LIDOCAINE HYDROCHLORIDE AND EPINEPHRINE 2 ML: 15; 5 INJECTION, SOLUTION EPIDURAL at 03:35

## 2024-10-17 NOTE — ANESTHESIA PROCEDURE NOTES
Labor Epidural      Patient reassessed immediately prior to procedure    Patient location during procedure: OB  Performed By  Anesthesiologist: Charly Steven DO  Preanesthetic Checklist  Completed: patient identified, IV checked, site marked, risks and benefits discussed, surgical consent, monitors and equipment checked, pre-op evaluation and timeout performed  Prep:  Pt Position:sitting  Sterile Tech:gloves, mask, sterile barrier and cap  Prep:chlorhexidine gluconate and isopropyl alcohol  Monitoring:blood pressure monitoring and continuous pulse oximetry  Epidural Block Procedure:  Approach:midline  Guidance:landmark technique and palpation technique  Location:L2-L3  Needle Type:Tuohy  Needle Gauge:17 G  Loss of Resistance Medium: air  Loss of Resistance: 6cm  Cath Depth at skin:12 cm  Paresthesia: none  Aspiration:negative  Test Dose:negative  Number of Attempts: 1  Post Assessment:  Dressing:secured with tape and occlusive dressing applied (Tegaderm Placed)  Pt Tolerance:patient tolerated the procedure well with no apparent complications  Complications:no

## 2024-10-17 NOTE — PAYOR COMM NOTE
"Landon Do (24 y.o. Female)     From:Katlyn Vernon LPN, Utilization Review  Phone #[phone]  Fax #[phone]    [employer] ID#[id number]     Delivery Information:  C Section 10/17/24 @ 07:49  Wt 3544 gms  Male  Apgars 8/9          Date of Birth   2000    Social Security Number       Address   [address] [place] [address]    Home Phone   [phone]    MRN   [id number]       Scientology   None    Marital Status   Single                            Admission Date   10/17/24    Admission Type   Elective    Admitting Provider   Divya Bower MD    Attending Provider   Divya Bower MD    Department, Room/Bed   [facility] 4A, N411/1       Discharge Date       Discharge Disposition       Discharge Destination                                 Attending Provider: Divya Bower MD    Allergies: No Known Allergies    Isolation: None   Infection: None   Code Status: CPR    Ht: 170.2 cm (67\")   Wt: 110 kg (243 lb)    Admission Cmt: None   Principal Problem: Pregnant [Z34.90]                   Active Insurance as of 10/17/2024       Primary Coverage       Payor Plan Insurance Group Employer/Plan Group    [employer] [place] [employer] [place]        Payor Plan Address Payor Plan Phone Number Payor Plan Fax Number Effective Dates    [address] [address] [phone]  7/5/2017 - None Entered    [facility] [id number]         Subscriber Name Subscriber Birth Date Member ID       LANDON DO 2000 [id number]                     Emergency Contacts        (Rel.) Home Phone Work Phone Mobile Phone    BrittneeSamy hathaway (Significant Other) [phone] -- --    AmanNiesha (Sister) -- -- [phone]    AmanBrinda (Mother) [phone] -- --              Insurance Information                  [facility] MEDICAID Phone: [phone]    Subscriber: Landon Do Subscriber#: [id number]    Group#: -- Precert#: -- "    Authorization#: -- Effective Date: --          History & Physical    No notes of this type exist for this encounter.       Facility-Administered Medications as of 10/17/2024   Medication Dose Route Frequency Provider Last Rate Last Admin    [COMPLETED] ceFAZolin Sodium (ANCEF) 2 g injection  - ADS Override Pull        2,000 mg at 10/17/24 0741    diphenhydrAMINE (BENADRYL) capsule 25 mg  25 mg Oral Q4H PRN Marleny Lee CRNA        Or    diphenhydrAMINE (BENADRYL) injection 25 mg  25 mg Intravenous Once PRN Marleny Lee CRNA        Or    diphenhydrAMINE (BENADRYL) injection 25 mg  25 mg Intramuscular Q4H PRN Marleny Lee CRNA        ePHEDrine Sulfate (Pressors) 5 MG/ML injection  - ADS Override Pull             [COMPLETED] famotidine (PEPCID) injection 20 mg  20 mg Intravenous Once PRN Charly Steven DO   20 mg at 10/17/24 0517    ketorolac (TORADOL) injection 30 mg  30 mg Intravenous Q6H Divya Bower MD   30 mg at 10/17/24 0936    lactated ringers infusion  125 mL/hr Intravenous Continuous Divya Bower  mL/hr at 10/17/24 0924 125 mL/hr at 10/17/24 0924    naloxone (NARCAN) injection 0.4 mg  0.4 mg Intravenous Q1H PRN Marleny Lee CRNA        ondansetron (ZOFRAN) injection 4 mg  4 mg Intravenous Once PRN Marleny Lee CRNA        [] oxytocin (PITOCIN) 30 units in 0.9% sodium chloride 500 mL (premix)  250 mL/hr Intravenous Continuous Divya Bower MD        ropivacaine (NAROPIN) 0.2 % injection  15 mL/hr Epidural Continuous Charly Steven DO   Stopped at 10/17/24 0740    [COMPLETED] Sod Citrate-Citric Acid (BICITRA) oral solution 30 mL  30 mL Oral Once Charly Steven DO   30 mL at 10/17/24 0740    sodium chloride 0.9 % infusion  - ADS Override Pull              Lab Results (last 24 hours)       Procedure Component Value Units Date/Time    Treponema pallidum AB w/Reflex  RPR [[id number]]  (Normal) Collected: 10/17/24 0209    Specimen: Blood Updated: 10/17/24 1131     Treponemal AB Total Non-Reactive    Narrative:      Reactive results will reflex RPR testing.    Protein / Creatinine Ratio, Urine - Urine, Clean Catch [[id number]] Collected: 10/17/24 0132    Specimen: Urine, Clean Catch Updated: 10/17/24 1112     Protein/Creatinine Ratio, Urine 107.2 mg/G Crea      Creatinine, Urine 209.8 mg/dL      Total Protein, Urine 22.5 mg/dL     Tissue Pathology Exam [[id number]] Collected: 10/17/24 0801    Specimen: Tissue from Fallopian Tubes, Bilateral Updated: 10/17/24 1055    Blood Gas, Arterial, Cord [[id number]]  (Abnormal) Collected: 10/17/24 0808    Specimen: Cord Blood Arterial from Umbilical Cord Updated: 10/17/24 0808     Site Umbilical     pH, Cord Arterial 7.19 pH Units      Comment: 85 Value below critical limit        pCO2, Cord Arterial 71.4 mmHg      pO2, Cord Arterial 6.0 mmHg      HCO3, Cord Arterial 26.9 mmol/L      Base Exc, Cord Arterial -3.7 mmol/L      Hemoglobin, Blood Gas 17.2 g/dL      CO2 Content 29.1 mmol/L      Temperature 37.0     Barometric Pressure for Blood Gas --     Comment: N/A        Modality Room Air     FIO2 21 %      Rate 0 Breaths/minute      PIP 0 cmH2O      Comment: Meter: [id number]     :  [id number]        IPAP 0     EPAP 0     Notified [facility] SARAH DEGROOT RN     Notified By [id number]     Notified Time 10/17/2024 08:09    Blood Gas, Venous, Cord [[id number]]  (Abnormal) Collected: 10/17/24 0806    Specimen: Cord Blood Venous from Umbilical Cord Updated: 10/17/24 0807     Site Right Radial     pH, Cord Venous 7.330 pH Units      pCO2, Cord Venous 42.9 mm Hg      pO2, Cord Venous 35.7 mm Hg      HCO3, Cord Venous 22.6 mmol/L      Base Excess, Cord Venous -3.4 mmol/L      Hemoglobin, Blood Gas 18.1 g/dL      CO2 Content 23.9 mmol/L      Temperature 37.0     Barometric Pressure for Blood Gas --     Comment: N/A        Modality Room Air     FIO2 21 %       Ventilator Mode --     Rate 0 Breaths/minute      PIP 0 cmH2O      Comment: Meter: [id number]     :  [id number]        IPAP 0     EPAP 0     O2 Saturation Calculated --     Comment: Calculated O2 saturation result not reported at this site.       Lactate Dehydrogenase [[id number]]  (Normal) Collected: 10/17/24 0209    Specimen: Blood Updated: 10/17/24 0242      U/L     Uric Acid [[id number]]  (Normal) Collected: 10/17/24 0209    Specimen: Blood Updated: 10/17/24 0242     Uric Acid 3.1 mg/dL      Comment: Falsely depressed results may occur on samples drawn from patients receiving N-Acetylcysteine (NAC) or Metamizole.       Comprehensive Metabolic Panel [[id number]]  (Abnormal) Collected: 10/17/24 0209    Specimen: Blood Updated: 10/17/24 0242     Glucose 91 mg/dL      BUN 5 mg/dL      Creatinine 0.46 mg/dL      Sodium 136 mmol/L      Potassium 3.9 mmol/L      Chloride 105 mmol/L      CO2 20.0 mmol/L      Calcium 8.4 mg/dL      Total Protein 6.3 g/dL      Albumin 3.3 g/dL      ALT (SGPT) 9 U/L      AST (SGOT) 15 U/L      Alkaline Phosphatase 159 U/L      Total Bilirubin 0.2 mg/dL      Globulin 3.0 gm/dL      Comment: Calculated Result        A/G Ratio 1.1 g/dL      BUN/Creatinine Ratio 10.9     Anion Gap 11.0 mmol/L      eGFR 137.2 mL/min/1.73     Narrative:      GFR Normal >60  Chronic Kidney Disease <60  Kidney Failure <15      CBC (No Diff) [[id number]]  (Abnormal) Collected: 10/17/24 0209    Specimen: Blood Updated: 10/17/24 0223     WBC 13.01 10*3/mm3      RBC 3.66 10*6/mm3      Hemoglobin 11.6 g/dL      Hematocrit 35.0 %      MCV 95.6 fL      MCH 31.7 pg      MCHC 33.1 g/dL      RDW 14.0 %      RDW-SD 49.0 fl      MPV 11.0 fL      Platelets 188 10*3/mm3           Orders (last 24 hrs)        Start     Ordered    10/17/24 1015  ketorolac (TORADOL) injection 30 mg  Every 6 Hours         10/17/24 0923    10/17/24 0930  oxytocin (PITOCIN) 30 units in 0.9% sodium chloride 500 mL (premix)  Continuous    "     Placed in \"Followed by\" Linked Group    10/17/24 0820    10/17/24 0915  oxytocin (PITOCIN) 30 units in 0.9% sodium chloride 500 mL (premix)  Once,   Status:  Discontinued        Placed in \"Followed by\" Linked Group    10/17/24 0820    10/17/24 0914  ketorolac (TORADOL) injection 30 mg  Every 6 Hours PRN,   Status:  Discontinued         10/17/24 0914    10/17/24 0900  Respirations  Every Hour      Comments: If respiratory rate is less than 10/min, notify the Anesthesiologist    10/17/24 0819    10/17/24 0821  Admit To Obstetrics Inpatient  Once         10/17/24 0820    10/17/24 0821  Notify Physician (specified)  Until Discontinued         10/17/24 0820    10/17/24 0821  Vital Signs Per Hospital Policy  Per Hospital Policy         10/17/24 0820    10/17/24 0821  Up Ad Nela  Until Discontinued         10/17/24 0820    10/17/24 0821  Fundal & Lochia Check  Per Order Details        Comments: Every 15 Minutes x4, Then Every 30 Minutes x2, Then Every Shift    10/17/24 0820    10/17/24 0821  Diet: Regular/House; Fluid Consistency: Thin (IDDSI 0)  Diet Effective Now         10/17/24 0820    10/17/24 0821  Advance Diet As Tolerated -  Until Discontinued         10/17/24 0820    10/17/24 0821  VTE Prophylaxis Not Indicated: Low Risk; Recent Trauma / Surgery (2)  Once         10/17/24 0820    10/17/24 0820  Strict Intake and Output  Every Shift       10/17/24 0820    10/17/24 0820  Fundal & Lochia Check  Every Shift       10/17/24 0820    10/17/24 0820  acetaminophen (TYLENOL) tablet 650 mg  Every 4 Hours PRN,   Status:  Discontinued         10/17/24 0820    10/17/24 0820  ibuprofen (ADVIL,MOTRIN) tablet 600 mg  Every 6 Hours PRN,   Status:  Discontinued         10/17/24 0820    10/17/24 0820  methylergonovine (METHERGINE) injection 200 mcg  Once As Needed,   Status:  Discontinued         10/17/24 0820    10/17/24 0820  carboprost (HEMABATE) injection 250 mcg  As Needed,   Status:  Discontinued         10/17/24 0820    " "10/17/24 0820  miSOPROStol (CYTOTEC) tablet 800 mcg  As Needed,   Status:  Discontinued         10/17/24 0820    10/17/24 0820  Nurse may remove epidural catheter after delivery.  Until Discontinued         10/17/24 0819    10/17/24 0820  Transfer to postpartum when discharge criteria met.  Until Discontinued         10/17/24 0819    10/17/24 0820  Vital Signs  Per Hospital Policy         10/17/24 0819    10/17/24 0820  If Respiratory Rate is Less Than 8/Min, See Narcan Order. Notify Anesthesiologist STAT.  Continuous         10/17/24 0819    10/17/24 0820  Blood Pressure and Pulse Every 4 Hours  Continuous,   Status:  Canceled         10/17/24 0819    10/17/24 0820  Activity & Removal of Sun Catheter, Per Obstetrician  Continuous,   Status:  Canceled         10/17/24 0819    10/17/24 0820  Notify Anesthesiologist for Any Questions / Problems  Continuous,   Status:  Canceled         10/17/24 0819    10/17/24 0820  Notify Anesthesia for Temp Over 101.4F  Continuous,   Status:  Canceled        Comments: May discharge from PACU with temperature at or above 95 degrees.    10/17/24 0819    10/17/24 0820  Ambu Bag at Bedside  Continuous         10/17/24 0819    10/17/24 0819  diphenhydrAMINE (BENADRYL) capsule 25 mg  Every 4 Hours PRN        Placed in \"Or\" Linked Group    10/17/24 0819    10/17/24 0819  diphenhydrAMINE (BENADRYL) injection 25 mg  Once As Needed        Placed in \"Or\" Linked Group    10/17/24 0819    10/17/24 0819  diphenhydrAMINE (BENADRYL) injection 25 mg  Every 4 Hours PRN        Placed in \"Or\" Linked Group    10/17/24 0819    10/17/24 0819  ondansetron (ZOFRAN) injection 4 mg  Once As Needed         10/17/24 0819    10/17/24 0819  naloxone (NARCAN) injection 0.4 mg  Every 1 Hour PRN         10/17/24 0819    10/17/24 0809  Blood Gas, Arterial, Cord  PROCEDURE ONCE         10/17/24 0808    10/17/24 0807  Blood Gas, Venous, Cord  PROCEDURE ONCE         10/17/24 0806    10/17/24 0801  Tissue Pathology " Exam  Once         10/17/24 0800    10/17/24 0739  sodium chloride 0.9 % infusion  - ADS Override Pull        Note to Pharmacy: Created by cabinet override    10/17/24 0739    10/17/24 0738  ceFAZolin Sodium (ANCEF) 2 g injection  - ADS Override Pull        Note to Pharmacy: Created by cabinet override    10/17/24 0738    10/17/24 0415  ropivacaine (NAROPIN) 0.2 % injection  Continuous         10/17/24 0317    10/17/24 0415  Sod Citrate-Citric Acid (BICITRA) oral solution 30 mL  Once         10/17/24 0317    10/17/24 0400  Vital Signs q 4 while awake  Every 4 Hours      Comments: While the patient is awake.    10/17/24 0053    10/17/24 0318  Vital Signs Per Anesthesia Guidelines  Per Order Details        Comments: Every 3 Minutes x20 Minutes Following Epidural Dosing, Then Every 15 Minutes If Stable    10/17/24 0317    10/17/24 0318  Start IV with #16 or #18 gauge angiocath.  Once         10/17/24 0317    10/17/24 0318  Nurse or anesthesiologist to remain with patient for 15 minutes following dosing.  Until Discontinued         10/17/24 0317    10/17/24 0318  Facilitate maternal postion on side and maintain uterine displacement.  Until Discontinued         10/17/24 0317    10/17/24 0318  Consult anesthesia services prior to changing epidural infusion/rate.  Until Discontinued         10/17/24 0317    10/17/24 0317  lactated ringers bolus 1,000 mL  As Needed,   Status:  Discontinued         10/17/24 0317    10/17/24 0317  ePHEDrine Sulfate (Pressors) 5 MG/ML injection 10 mg  Every 10 Minutes PRN,   Status:  Discontinued         10/17/24 0317    10/17/24 0317  metoclopramide (REGLAN) injection 10 mg  Once As Needed,   Status:  Discontinued         10/17/24 0317    10/17/24 0317  ondansetron (ZOFRAN) injection 4 mg  Once As Needed,   Status:  Discontinued         10/17/24 0317    10/17/24 0317  famotidine (PEPCID) injection 20 mg  Once As Needed         10/17/24 0317    10/17/24 0317  diphenhydrAMINE (BENADRYL)  injection 12.5 mg  Every 8 Hours PRN,   Status:  Discontinued         10/17/24 0317    10/17/24 0309  ePHEDrine Sulfate (Pressors) 5 MG/ML injection  - ADS Override Pull        Note to Pharmacy: Created by cabinet override    10/17/24 0309    10/17/24 0145  sodium chloride 0.9 % flush 10 mL  Every 12 Hours Scheduled,   Status:  Discontinued         10/17/24 0053    10/17/24 0145  lactated ringers infusion  Continuous         10/17/24 0053    10/17/24 0145  mineral oil liquid 30 mL  Once,   Status:  Discontinued         10/17/24 0053    10/17/24 0145  oxytocin (PITOCIN) 30 units in 0.9% sodium chloride 500 mL (premix)  Titrated,   Status:  Discontinued         10/17/24 0053    10/17/24 0054  Code Status and Medical Interventions: CPR (Attempt to Resuscitate); Full Support  Continuous         10/17/24 0053    10/17/24 0054  Obtain informed consent  Once         10/17/24 0053    10/17/24 0054  Vital Signs Per Hospital Policy  Per Hospital Policy         10/17/24 0053    10/17/24 0054  Mini- prep prior to delivery  Once         10/17/24 0053    10/17/24 0054  Continuous Fetal Monitoring With NST on Admission and Prior to Initiation of Oxytocin.  Per Order Details        Comments: Continuous Fetal Monitoring With NST on Admission & Prior to Initiation of Oxytocin.    10/17/24 0053    10/17/24 0054  External Uterine Contraction Monitoring  Per Hospital Policy         10/17/24 0053    10/17/24 0054  Notify Physician (specified)  Until Discontinued         10/17/24 0053    10/17/24 0054  Notify physician for tachysystole (per hospital algorithm)  Until Discontinued         10/17/24 0053    10/17/24 0054  Notify physician if membranes ruptured, bleeding greater than 1 pad an hour, fetal heart tone abnormality, and severe pain  Until Discontinued         10/17/24 0053    10/17/24 0054  Initiate Group Beta Strep (GBS) Prophylaxis Protocol, If Criteria Met  Continuous        Comments: NO TREATMENT RECOMMENDED IF: 1)  Maternal  GBS status known negative 2)  Scheduled  birth with intact membranes, not in labor.  3 ) Maternal GBS unknown, no risk factors.   TREAT WITH ANTIBIOTICS IF:  1)  Maternal GBS status is known postive.  2)  Maternal GBS status unknown with these risk factors:  a)  Previous infant affected by GBS infection.  b)  GBS urinary tract infection (UTI) or bacteruria during pregnancy  c)  Unexplained maternal fever in labor (greater than or equal to 100.4F or 38.0C)  d)  Prolonged rupture of the membranes greater than or equal to 18 hours.  e)  Gestational age less than 37 weeks.    10/17/24 0053    10/17/24 0054  Sun Bulb Cervical Ripening Without Infusion  Once        Comments: Have Laborist Place Cervical Sun Without Infusion.    If Unable to Place Sun, please call    10/17/24 0053    10/17/24 0054  NPO Diet NPO Type: Ice Chips  Diet Effective Now,   Status:  Canceled         10/17/24 0053    10/17/24 0054  CBC (No Diff)  Once         10/17/24 0053    10/17/24 005  Treponema pallidum AB w/Reflex RPR  Once         10/17/24 0053    10/17/24 005  Type & Screen  Once         10/17/24 0053    10/17/24 005  Insert Peripheral IV  Once         10/17/24 0053    10/17/24 005  Saline Lock & Maintain IV Access  Continuous         10/17/24 0053    10/17/24 005  Protein / Creatinine Ratio, Urine - Urine, Clean Catch  Once         10/17/24 0053    10/17/24 005  Lactate Dehydrogenase  Once         10/17/24 0053    10/17/24 005  Uric Acid  Once         10/17/24 0053    10/17/24 005  Comprehensive Metabolic Panel  Once         10/17/24 0053    10/17/24 0053  sodium chloride 0.9 % flush 10 mL  As Needed,   Status:  Discontinued         10/17/24 0053    10/17/24 0053  sodium chloride 0.9 % infusion 40 mL  As Needed,   Status:  Discontinued         10/17/24 0053    10/17/24 0053  lidocaine PF 1% (XYLOCAINE) injection 0.5 mL  Once As Needed,   Status:  Discontinued         10/17/24 0053    10/17/24 0053  lactated ringers  "bolus 1,000 mL  Once As Needed,   Status:  Discontinued         10/17/24 0053    10/17/24 0053  acetaminophen (TYLENOL) tablet 650 mg  Every 4 Hours PRN,   Status:  Discontinued         10/17/24 0053    10/17/24 0053  ondansetron ODT (ZOFRAN-ODT) disintegrating tablet 4 mg  Every 6 Hours PRN,   Status:  Discontinued        Placed in \"Or\" Linked Group    10/17/24 0053    10/17/24 0053  ondansetron (ZOFRAN) injection 4 mg  Every 6 Hours PRN,   Status:  Discontinued        Placed in \"Or\" Linked Group    10/17/24 0053    10/17/24 0053  morphine injection 1 mg  Every 4 Hours PRN,   Status:  Discontinued        Placed in \"And\" Linked Group    10/17/24 0053    10/17/24 0053  naloxone (NARCAN) injection 0.4 mg  Every 5 Minutes PRN,   Status:  Discontinued        Placed in \"And\" Linked Group    10/17/24 0053    10/17/24 0053  morphine injection 2 mg  Every 4 Hours PRN,   Status:  Discontinued        Placed in \"And\" Linked Group    10/17/24 0053    10/17/24 0053  naloxone (NARCAN) injection 0.4 mg  Every 5 Minutes PRN,   Status:  Discontinued        Placed in \"And\" Linked Group    10/17/24 0053    Unscheduled  Position Change - For Intra-Uterine Resusitation for Hypertonus, HyperStimulation or Non-Reassuring Fetal Status  As Needed       10/17/24 0053    Unscheduled  IV Site Care  As Needed       10/17/24 0819    Unscheduled  Blood Gas, Arterial -With Co-Ox Panel: Yes  As Needed       10/17/24 0819    Unscheduled  Apply Ice to Perineum  As Needed       10/17/24 0820    Unscheduled  Bladder Assessment  As Needed       10/17/24 0820    Signed and Held  Code Status and Medical Interventions: CPR (Attempt to Resuscitate); Full  Continuous         Signed and Held    Signed and Held  Vital Signs Per hospital policy  Per Hospital Policy         Signed and Held    Signed and Held  Notify Physician  Until Discontinued         Signed and Held    Signed and Held  Up As Tolerated  Until Discontinued         Signed and Held    Signed and " Held  Ambulate Patient  2 Times Daily      Comments: After anesthesia wears off.    Signed and Held    Signed and Held  Patient May Shower  Once        Comments: After anesthesia wears off and with assistance    Signed and Held    Signed and Held  Diet: Regular/House; Fluid Consistency: Thin (IDDSI 0)  Diet Effective Now         Signed and Held    Signed and Held  Advance Diet As Tolerated -Regular  Until Discontinued         Signed and Held    Signed and Held  I/O  Every Shift       Signed and Held    Signed and Held  Fundal and Lochia Check  Per Hospital Policy        Comments: Q 30 min x 2, Q 1 hr x 4, Q 4 hrs x 24 hrs, then Q shift.    Signed and Held    Signed and Held  Continue Indwelling Urinary Catheter Already in Place  Once         Signed and Held    Signed and Held  Discontinue Indwelling Urinary Catheter in AM  Once         Signed and Held    Signed and Held  Bladder Scan if Patient Unable to Void 4-6 Hours After Catheter Removal  As Needed         Signed and Held    Signed and Held  Straight Cath Every 4-6 Hours As Needed If Patient is Unable to Void After 4-6 Hours, Bladder Scan Volume is Greater Than 350-500mL & Patient Has Symptoms of Bladder Discomfort / Distention  As Needed         Signed and Held    Signed and Held  Notify Provider if Bladder Distention Continues  Until Discontinued         Signed and Held    Signed and Held  Schedule / Prompt Voiding For Patients With Urinary Incontinence  As Needed         Signed and Held    Signed and Held  Urinary Catheter Care  Every Shift       Signed and Held    Signed and Held  Wound Care  As Needed        Comments: Postop day 1. Remove dressing and leave incision open to air.    Signed and Held    Signed and Held  Turn Cough Deep Breathe  Once         Signed and Held    Signed and Held  Incentive spirometry RT  Every Hour       Signed and Held    Signed and Held  Encourage early intake of PO fluids  Continuous         Signed and Held    Signed and Held   "Ambulate Patient 3-5 times per day (with or without Sun)  Every Shift       Signed and Held    Signed and Held  Chewing Gum  As Needed         Signed and Held    Signed and Held  Apply Abdominal Binding Until Discontinued  Until Discontinued         Signed and Held    Signed and Held  \"If patient tolerates food and liquids after completion of second bag of Pitocin, saline lock IV and discontinue IV fluid infusions.  Once         Signed and Held    Signed and Held  Warm compress  As Needed         Signed and Held    Signed and Held  Breast pump to bed  Once         Signed and Held    Signed and Held  Apply ace wrap, tight bra, or binder  As Needed         Signed and Held    Signed and Held  Apply ice packs  As Needed         Signed and Held    Signed and Held  If indicated -- Please administer RH Immunoglobulin based on results of cord blood evaluation and fetal screen lab tests, pharmacy to dispense  Per Order Details        Comments: See Process Instructions For Reference Range Details.    Signed and Held    Signed and Held  CBC & Differential  Timed         Signed and Held    Signed and Held  Insert Peripheral IV  Once         Signed and Held    Signed and Held  Saline Lock & Maintain IV Access  Continuous         Signed and Held    Signed and Held  sodium chloride 0.9 % flush 10 mL  Every 12 Hours Scheduled         Signed and Held    Signed and Held  sodium chloride 0.9 % flush 1-10 mL  As Needed         Signed and Held    Signed and Held  sodium chloride 0.9 % infusion 40 mL  As Needed         Signed and Held    Signed and Held  sodium chloride 0.9 % bolus 1,000 mL  Once         Signed and Held    Signed and Held  oxytocin (PITOCIN) 30 units in 0.9% sodium chloride 500 mL (premix)  Once As Needed         Signed and Held    Signed and Held  acetaminophen (TYLENOL) tablet 1,000 mg  Every 6 Hours        Placed in \"Followed by\" Linked Group    Signed and Held    Signed and Held  acetaminophen (TYLENOL) tablet 650 " "mg  Every 6 Hours        Placed in \"Followed by\" Linked Group    Signed and Held    Signed and Held  ketorolac (TORADOL) injection 15 mg  Every 6 Hours        Placed in \"Followed by\" Linked Group    Signed and Held    Signed and Held  ibuprofen (ADVIL,MOTRIN) tablet 600 mg  Every 6 Hours        Placed in \"Followed by\" Linked Group    Signed and Held    Signed and Held  oxyCODONE (ROXICODONE) immediate release tablet 5 mg  Every 4 Hours PRN        Placed in \"Or\" Linked Group    Signed and Held    Signed and Held  oxyCODONE (ROXICODONE) immediate release tablet 10 mg  Every 4 Hours PRN        Placed in \"Or\" Linked Group    Signed and Held    Signed and Held  docusate sodium (COLACE) capsule 100 mg  2 Times Daily PRN         Signed and Held    Signed and Held  simethicone (MYLICON) chewable tablet 80 mg  4 Times Daily PRN         Signed and Held    Signed and Held  ondansetron ODT (ZOFRAN-ODT) disintegrating tablet 4 mg  Every 8 Hours PRN         Signed and Held    Signed and Held  lanolin topical 1 Application  Every 1 Hour PRN         Signed and Held    Signed and Held  carboprost (HEMABATE) injection 250 mcg  As Needed         Signed and Held    Signed and Held  miSOPROStol (CYTOTEC) tablet 600 mcg  As Needed         Signed and Held    Signed and Held  methylergonovine (METHERGINE) injection 200 mcg  As Needed         Signed and Held    Signed and Held  Hydrocortisone (Perianal) (ANUSOL-HC) 2.5 % rectal cream 1 Application  As Needed         Signed and Held    Signed and Held  prenatal vitamin tablet 1 tablet  Daily         Signed and Held    Signed and Held  aluminum-magnesium hydroxide-simethicone (MAALOX MAX) 400-400-40 MG/5ML suspension 15 mL  Every 4 Hours PRN        Placed in \"Or\" Linked Group    Signed and Held    Signed and Held  calcium carbonate (TUMS) chewable tablet 500 mg (200 mg elemental)  Every 4 Hours PRN        Placed in \"Or\" Linked Group    Signed and Held    Signed and Held  Place Sequential " Compression Device  Once         Signed and Held    Signed and Held  Maintain Sequential Compression Device  Continuous         Signed and Held                  Operative/Procedure Notes (last 24 hours)  Notes from 10/16/24 1318 through 10/17/24 1318   No notes of this type exist for this encounter.          Physician Progress Notes (last 24 hours)        Cyndy Luna MD at 10/17/24 0634          Asked by provider to AROM  Pt resting comfortably with epidural  4/50/-3/posterior/firm/ bbow  Clear fluid  FHR 130s- 120s moderate variability    Electronically signed by Cyndy Luna MD at 10/17/24 0636

## 2024-10-17 NOTE — ANESTHESIA PREPROCEDURE EVALUATION
Anesthesia Evaluation     Patient summary reviewed and Nursing notes reviewed                Airway   Mallampati: II  TM distance: >3 FB  Neck ROM: full  No difficulty expected  Dental      Pulmonary - negative pulmonary ROS   Cardiovascular - negative cardio ROS        Neuro/Psych  (+) headaches, psychiatric history Depression  GI/Hepatic/Renal/Endo - negative ROS     Musculoskeletal (-) negative ROS        ROS comment: Scoliosis  Abdominal    Substance History - negative use     OB/GYN    (+) Pregnant        Other                    Anesthesia Plan    ASA 2     epidural       Anesthetic plan, risks, benefits, and alternatives have been provided, discussed and informed consent has been obtained with: patient.    Use of blood products discussed with patient .      CODE STATUS:    Level Of Support Discussed With: Patient  Code Status (Patient has no pulse and is not breathing): CPR (Attempt to Resuscitate)  Medical Interventions (Patient has pulse or is breathing): Full Support

## 2024-10-17 NOTE — ANESTHESIA POSTPROCEDURE EVALUATION
Patient: Sari Newton    Procedure Summary       Date: 10/17/24 Room / Location: [facility] LABOR DELIVERY 3 /  [facility] LABOR DELIVERY    Anesthesia Start: 325 Anesthesia Stop: 837    Procedure:  SECTION PRIMARY (Abdomen) Diagnosis:     Surgeons: Divya Bower MD Provider: Charly Steven DO    Anesthesia Type: epidural ASA Status: 2            Anesthesia Type: epidural    Vitals  Vitals Value Taken Time   /48 10/17/24 0836   Temp 98.4    Pulse 101 10/17/24 0836   Resp 16    SpO2 98 % 10/17/24 0836   Vitals shown include unfiled device data.        Post Anesthesia Care and Evaluation    Patient location during evaluation: bedside  Patient participation: complete - patient participated  Level of consciousness: awake and alert  Pain management: adequate    Airway patency: patent  Anesthetic complications: No anesthetic complications    Cardiovascular status: acceptable  Respiratory status: acceptable  Hydration status: acceptable

## 2024-10-17 NOTE — OP NOTE
Operative Note    Patient name: Sari Newton  YOB: 2000   MRN: [id number]  Admission Date: 10/17/2024  Referring Provider: Divya Bower*    ID: 24 y.o.  at 39w0d    Preoperative Diagnosis:   Pregnant       Postoperative Diagnosis: Same as above.    Procedure(s): primarylow transverse  delivery, Bilateral salpingectomy     Procedures:    *  SECTION PRIMARY    Procedure(s):   SECTION PRIMARY    Surgeons: Surgeons and Role:     * Divya Bower MD - Primary     * Cyndy Luna MD - Assisting    Anesthesia: Epidural    Estimated Blood Loss: <500ml mL    Preoperative antibiotic: cefazolin (Ancef)    Blood products:   Blood Administration Record (From admission, onward)      None            Pathology:   Order Name Source Comment Collection Info Order Time   TISSUE PATHOLOGY EXAM Fallopian Tubes, Bilateral  Collected By: Edwina Mckinnon RN 10/17/2024  8:00 AM     Specimen source(s):   Fallopian Tubes, Bilateral          Release to patient   Routine Release            Drains: Sun catheter to gravity    Complications: None    Condition: Stable to recovery room    Infant:                Gender: male infant    Weight: 3544 g (7 lb 13 oz)    Apgars: 8  @ 1 minute /     9  @ 5 minutes    Cord gases: Venous:    pH, Cord Venous   Date Value Ref Range Status   10/17/2024 7.330 7.310 - 7.370 pH Units Final     Base Excess, Cord Venous   Date Value Ref Range Status   10/17/2024 -3.4 (L) 0.0 - 2.0 mmol/L Final        Arterial:    pH, Cord Arterial   Date Value Ref Range Status   10/17/2024 7.19 (C) 7.22 - 7.30 pH Units Final     Comment:     85 Value below critical limit     Base Exc, Cord Arterial   Date Value Ref Range Status   10/17/2024 -3.7 (L) 0.0 - 2.0 mmol/L Final            Operative Summary:   After obtaining informed consent the patient was taken to the operating room where adequate anesthesia was obtained.  Sun catheter was placed in the  bladder preoperatively.  IV antibiotics were given preoperatively.       The abdomen was prepped and draped in the usual sterile fashion for  delivery.  After confirming adequate anesthesia a Pfannenstiel skin incision was made with the scalpel and carried through to the underlying layer of fascia.  The fascia was incised in the midline and the incision extended laterally with the Weaver scissors and with blunt dissection.       The upper aspect of the fascia was grasped with 2 Kocher clamps, elevated, and dissected off the underlying rectus muscles bluntly and with the Weaver scissors.  The Kocher clamps were removed and applied to the inferior aspect of the fascia.  The fascia was dissected off of the rectus muscles in the same fashion.  The peritoneum was entered bluntly.  The incision was stretched and the bladder blade and Phelan retractor inserted for visualization of the uterus.       The uterus was incised with the scalpel in a low transverse fashion.  The uterine incision was entered digitally and the incision extended bluntly in a cranial-caudal fashion.  Retractors were removed and membranes were ruptured.  The infant was delivered atraumatically from vertex presentation.  The umbilical cord was clamped and cut and the nose and mouth bulb suctioned.  The infant was handed off to waiting pediatric staff.       Cord blood gases were collected from a clamped segment of umbilical cord.  Cord blood was collected.  The placenta was removed using cord traction and uterine massage.  The uterus was exteriorized and cleared of all clots and debris.  The uterine incision was repaired with #1 Vicryl in a running locked fashion. A single-layer technique was used.  Additional hemostatic measures required: electrocautery and figure-of-eight sutures.    The incision was inspected and excellent hemostasis was noted.  The tubes and ovaries were noted to be normal.  The appendix The appendix was not visualized..       The right tube was grasped with a Cary clamp.  An incision in the mesosalpinx was made with the bovie.  The entirety of the tube was removed with the Enseal device in multiple clamp, burn and cut cycles.  Excellent hemostasis was noted.  The left tube was removed in a similar fashion.  Excellent hemostasis was noted.  The uterus was returned to the abdomen.  The gutters were cleared of all clots and debris.  Irrigation was used. The uterine incision and tubal sites were again inspected and found to be hemostatic.       The peritoneum was reapproximated with 2-0 Vicryl.  The fascia was closed with #1 Vicryl in a running fashion.  The subcutaneous space was reapproximated using 2-0 Vicryl.      The skin was closed using 3-0 Vicryl in a subcutaneous fashion and skin glue.   The patient was transferred to the recovery room in stable condition.    Divya Bower MD  10/17/2024  08:36 EDT
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
## 2024-10-17 NOTE — PROGRESS NOTES
Asked by provider to AROM  Pt resting comfortably with epidural  4/50/-3/posterior/firm/ bbow  Clear fluid  FHR 130s- 120s moderate variability

## 2024-10-18 ENCOUNTER — PATIENT OUTREACH (OUTPATIENT)
Dept: LABOR AND DELIVERY | Facility: HOSPITAL | Age: 24
End: 2024-10-18
Payer: COMMERCIAL

## 2024-10-18 LAB
BASOPHILS # BLD AUTO: 0.03 10*3/MM3 (ref 0–0.2)
BASOPHILS NFR BLD AUTO: 0.2 % (ref 0–1.5)
CYTO UR: NORMAL
DEPRECATED RDW RBC AUTO: 50.4 FL (ref 37–54)
EOSINOPHIL # BLD AUTO: 0.05 10*3/MM3 (ref 0–0.4)
EOSINOPHIL NFR BLD AUTO: 0.3 % (ref 0.3–6.2)
ERYTHROCYTE [DISTWIDTH] IN BLOOD BY AUTOMATED COUNT: 14 % (ref 12.3–15.4)
HCT VFR BLD AUTO: 28.4 % (ref 34–46.6)
HGB BLD-MCNC: 9.2 G/DL (ref 12–15.9)
IMM GRANULOCYTES # BLD AUTO: 0.08 10*3/MM3 (ref 0–0.05)
IMM GRANULOCYTES NFR BLD AUTO: 0.6 % (ref 0–0.5)
LAB AP CASE REPORT: NORMAL
LAB AP CLINICAL INFORMATION: NORMAL
LYMPHOCYTES # BLD AUTO: 2 10*3/MM3 (ref 0.7–3.1)
LYMPHOCYTES NFR BLD AUTO: 13.9 % (ref 19.6–45.3)
MCH RBC QN AUTO: 31.4 PG (ref 26.6–33)
MCHC RBC AUTO-ENTMCNC: 32.4 G/DL (ref 31.5–35.7)
MCV RBC AUTO: 96.9 FL (ref 79–97)
MONOCYTES # BLD AUTO: 1.37 10*3/MM3 (ref 0.1–0.9)
MONOCYTES NFR BLD AUTO: 9.5 % (ref 5–12)
NEUTROPHILS NFR BLD AUTO: 10.85 10*3/MM3 (ref 1.7–7)
NEUTROPHILS NFR BLD AUTO: 75.5 % (ref 42.7–76)
NRBC BLD AUTO-RTO: 0 /100 WBC (ref 0–0.2)
PATH REPORT.FINAL DX SPEC: NORMAL
PATH REPORT.GROSS SPEC: NORMAL
PLATELET # BLD AUTO: 150 10*3/MM3 (ref 140–450)
PMV BLD AUTO: 11.1 FL (ref 6–12)
RBC # BLD AUTO: 2.93 10*6/MM3 (ref 3.77–5.28)
WBC NRBC COR # BLD AUTO: 14.38 10*3/MM3 (ref 3.4–10.8)

## 2024-10-18 PROCEDURE — 85025 COMPLETE CBC W/AUTO DIFF WBC: CPT | Performed by: OBSTETRICS & GYNECOLOGY

## 2024-10-18 PROCEDURE — 25010000002 KETOROLAC TROMETHAMINE PER 15 MG: Performed by: OBSTETRICS & GYNECOLOGY

## 2024-10-18 RX ADMIN — SIMETHICONE 80 MG: 80 TABLET, CHEWABLE ORAL at 14:22

## 2024-10-18 RX ADMIN — ACETAMINOPHEN 1000 MG: 500 TABLET ORAL at 07:47

## 2024-10-18 RX ADMIN — OXYCODONE 5 MG: 5 TABLET ORAL at 14:22

## 2024-10-18 RX ADMIN — IBUPROFEN 600 MG: 600 TABLET, FILM COATED ORAL at 11:08

## 2024-10-18 RX ADMIN — IBUPROFEN 600 MG: 600 TABLET, FILM COATED ORAL at 23:22

## 2024-10-18 RX ADMIN — DOCUSATE SODIUM 100 MG: 100 CAPSULE, LIQUID FILLED ORAL at 07:47

## 2024-10-18 RX ADMIN — IBUPROFEN 600 MG: 600 TABLET, FILM COATED ORAL at 17:15

## 2024-10-18 RX ADMIN — ACETAMINOPHEN 650 MG: 325 TABLET ORAL at 14:18

## 2024-10-18 RX ADMIN — KETOROLAC TROMETHAMINE 15 MG: 15 INJECTION, SOLUTION INTRAMUSCULAR; INTRAVENOUS at 04:29

## 2024-10-18 RX ADMIN — PRENATAL VITAMINS-IRON FUMARATE 27 MG IRON-FOLIC ACID 0.8 MG TABLET 1 TABLET: at 07:48

## 2024-10-18 RX ADMIN — SIMETHICONE 80 MG: 80 TABLET, CHEWABLE ORAL at 07:47

## 2024-10-18 RX ADMIN — OXYCODONE 5 MG: 5 TABLET ORAL at 07:47

## 2024-10-18 RX ADMIN — ACETAMINOPHEN 1000 MG: 500 TABLET ORAL at 02:18

## 2024-10-18 RX ADMIN — ACETAMINOPHEN 650 MG: 325 TABLET ORAL at 19:27

## 2024-10-18 NOTE — PROGRESS NOTES
10/18/2024    Name:Sari Newton    MR#:[id number]     PROGRESS NOTE:  Post-Op 1 S/P    HD:1    Subjective   24 y.o. yo Female  s/p CS at 39w0d doing well. Pain well controlled. Tolerating regular diet and having flatus. Lochia normal.       Pregnant       Objective    Vitals  Temp:  Temp:  [97.8 °F (36.6 °C)-99.2 °F (37.3 °C)] 99.2 °F (37.3 °C)  Temp src: Oral  BP:  BP: (104-137)/(39-93) 133/61  Pulse:  Heart Rate:  [57-98] 80  RR:   Resp:  [16-20] 16    General Awake, alert, no distress  Abdomen Soft, non-distended, fundus firm, below umbilicus, appropriately tender  Incision  Intact, no erythema or exudate  Extremities Calves NT bilaterally     I/O last 3 completed shifts:  In: 800 [I.V.:800]  Out: 2886 [Urine:2600; Blood:286]    LABS:   Lab Results   Component Value Date    WBC 13.01 (H) 10/17/2024    HGB 11.6 (L) 10/17/2024    HCT 35.0 10/17/2024    MCV 95.6 10/17/2024     10/17/2024       Infant: male      Assessment   1.  POD 1, doing well; am labs pending   2.  Baby boy well; desires circ   3.  Hx of ppd--- she denies any current feelings of ppd.  She declines Rx for now.      Plan:  Routine postoperative care.  Advance.      Active Problems:   None      Stephie Crews, APRN  10/18/2024 07:26 EDT

## 2024-10-18 NOTE — OUTREACH NOTE
[facility]  IP Postpartum    Questions/Answers      Flowsheet Row Responses   Best Method for Contacting Cell   Support Person Present Yes   Does the patient have a car seat at the hospital Yes   Delivery Note Reviewed Reviewed   Were birth expectations met? Other   Birth Expectations Other Comment Was not expecting a C/S but it went well considering.   Is there a need for additional support/resources? No   Lactation Note Reviewed Not Reviewed   Not Reviewed Comment Has been formula feeding but states may pump when she gets home if milk is coming in.   Is additional support needed? No   Any questions or concerns? No   Is the patient going to use Meds to Beds? Yes   Any concerns related discharge meds/ability to  prescriptions? No   OB Discharge Navigator Reviewed  Not Reviewed   Comment ongoing PP Care   Confirm Postpartum OB appointment No   Confirm initial well-child Pediatrician appointment date/time: Yes   Initial Well Child Pediatrician Appointment Date 10/21/24   Additional post-discharge F/U appointments No   Does patient have transportation to appointments? Yes   Any other assistance needed to ensure she is able to attend appointments? No   Does patient have supplies needed at home for  care? Breast Pump, Clothing, Crib, Diapers            Feeling well after delivery. Provided with outpatient lactation clinic contact information for pumping assistance after discharge. Sings and symptoms of preeclampsia reviewed. VU. Provided with POST Birth warning signs flyer from [employer]. Encouraged to call with questions, concerns, or for support. Contact information provided. Will f/u 10/28/24.    RAMOS Bruner RN  Maternity Nurse Navigator    10/18/2024, 18:59 EDT

## 2024-10-18 NOTE — ANESTHESIA POSTPROCEDURE EVALUATION
Patient: Sari Newton    Procedure Summary       Date: 10/17/24 Room / Location: [facility] LABOR DELIVERY 3 /  [facility]OR DELIVERY    Anesthesia Start: 325 Anesthesia Stop: 837    Procedure:  SECTION PRIMARY (Abdomen) Diagnosis:     Surgeons: Divya Bower MD Provider: Charly Steven DO    Anesthesia Type: epidural ASA Status: 2            Anesthesia Type: epidural    Vitals  Vitals Value Taken Time   /57 10/17/24 1030   Temp 98.4 °F (36.9 °C) 10/17/24 0838   Pulse 57 10/17/24 1030   Resp 16 10/17/24 0838   SpO2 97 % 10/17/24 1030           Post Anesthesia Care and Evaluation    Patient location during evaluation: bedside  Patient participation: complete - patient participated  Level of consciousness: awake and alert  Pain management: adequate    Airway patency: patent  Anesthetic complications: No anesthetic complications    Cardiovascular status: acceptable  Respiratory status: acceptable  Hydration status: acceptable  Post Neuraxial Block status: Motor and sensory function returned to baseline and No signs or symptoms of PDPH

## 2024-10-19 VITALS
HEIGHT: 67 IN | BODY MASS INDEX: 38.14 KG/M2 | SYSTOLIC BLOOD PRESSURE: 112 MMHG | OXYGEN SATURATION: 98 % | TEMPERATURE: 98.3 F | WEIGHT: 243 LBS | HEART RATE: 78 BPM | DIASTOLIC BLOOD PRESSURE: 62 MMHG | RESPIRATION RATE: 16 BRPM

## 2024-10-19 RX ORDER — OXYCODONE HYDROCHLORIDE 5 MG/1
5 TABLET ORAL EVERY 6 HOURS PRN
Qty: 12 TABLET | Refills: 0 | Status: SHIPPED | OUTPATIENT
Start: 2024-10-19 | End: 2024-10-22

## 2024-10-19 RX ORDER — IBUPROFEN 600 MG/1
600 TABLET, FILM COATED ORAL EVERY 6 HOURS
Qty: 30 TABLET | Refills: 0 | Status: SHIPPED | OUTPATIENT
Start: 2024-10-19

## 2024-10-19 RX ADMIN — IBUPROFEN 600 MG: 600 TABLET, FILM COATED ORAL at 05:48

## 2024-10-19 RX ADMIN — SIMETHICONE 80 MG: 80 TABLET, CHEWABLE ORAL at 08:32

## 2024-10-19 RX ADMIN — PRENATAL VITAMINS-IRON FUMARATE 27 MG IRON-FOLIC ACID 0.8 MG TABLET 1 TABLET: at 08:32

## 2024-10-19 RX ADMIN — ACETAMINOPHEN 650 MG: 325 TABLET ORAL at 02:06

## 2024-10-19 RX ADMIN — ACETAMINOPHEN 650 MG: 325 TABLET ORAL at 08:32

## 2024-10-19 NOTE — PROGRESS NOTES
10/19/2024    Name:Sari Newton    MR#:[id number]     PROGRESS NOTE:  Post-Op 2 S/P    HD:2    Subjective   24 y.o. yo Female  s/p CS at 39w0d doing well. Pain well controlled. Tolerating regular diet and having flatus. Lochia normal.       Pregnant       Objective    Vitals  Temp:  Temp:  [98.2 °F (36.8 °C)-98.8 °F (37.1 °C)] 98.3 °F (36.8 °C)  Temp src: Oral  BP:  BP: (112-135)/(58-78) 112/62  Pulse:  Heart Rate:  [73-99] 78  RR:   Resp:  [16-18] 16    General Awake, alert, no distress  Abdomen Soft, non-distended, fundus firm, below umbilicus, appropriately tender  Incision  Intact, no erythema or exudate  Extremities Calves NT bilaterally     I/O last 3 completed shifts:  In: -   Out: 2950 [Urine:2950]    LABS:   Lab Results   Component Value Date    WBC 14.38 (H) 10/18/2024    HGB 9.2 (L) 10/18/2024    HCT 28.4 (L) 10/18/2024    MCV 96.9 10/18/2024     10/18/2024       Infant: male      Assessment   1.  POD 2; doing well   2.  Baby boy well; circ per specialist   3.  Hx ppd--- denies current feelings of ppd and declines Rx    Plan: Routine postoperative care      Active Problems:   None      Stephie Crews, MARISABEL  10/19/2024 08:38 EDT

## 2024-10-19 NOTE — DISCHARGE SUMMARY
Discharge Summary    Date of Admission: 10/17/2024  Date of Discharge:  10/19/2024      Patient: Sari Newton      MR#:[id number]    Delivery Provider: Divya Bower        Presenting Problem/History of Present Illness  Pregnant [Z34.90]       Pregnant         Discharge Diagnosis: csection at 39w0d    Procedures:  , Low Transverse    10/17/2024   7:49 AM         Hospital Course  Patient is a 24 y.o. female  at 39w0d status post csection without complication. Postpartum the patient did well. She remained afebrile, with vital signs stable. She requested discharge on postpartum day 2.     Infant:   male fetus 3544 g (7 lb 13 oz) with Apgar scores of 8 , 9  at five minutes.    Condition on Discharge:  Stable    Vital Signs  Temp:  [98.2 °F (36.8 °C)-98.8 °F (37.1 °C)] 98.3 °F (36.8 °C)  Heart Rate:  [73-99] 78  Resp:  [16-18] 16  BP: (112-135)/(58-78) 112/62    Lab Results   Component Value Date    WBC 14.38 (H) 10/18/2024    HGB 9.2 (L) 10/18/2024    HCT 28.4 (L) 10/18/2024    MCV 96.9 10/18/2024     10/18/2024       Discharge Disposition  Home or Self Care    Discharge Medications     Discharge Medications        New Medications        Instructions Start Date   ibuprofen 600 MG tablet  Commonly known as: ADVIL,MOTRIN   600 mg, Oral, Every 6 Hours      oxyCODONE 5 MG immediate release tablet  Commonly known as: ROXICODONE   5 mg, Oral, Every 6 Hours PRN             Continue These Medications        Instructions Start Date   diphenhydrAMINE 25 MG tablet  Commonly known as: Benadryl Allergy   25 mg, Oral, Every 6 Hours PRN      ondansetron ODT 4 MG disintegrating tablet  Commonly known as: ZOFRAN-ODT   4 mg, Oral, Every 8 Hours PRN                 Follow-up Appointments  No future appointments.  Additional Instructions for the Follow-ups that You Need to Schedule       Discharge Follow-up with Specified Provider: hailee; 2 Weeks   As directed      To: hailee   Follow Up: 2 Weeks                 Stephie Crews, APRN  10/19/24  11:11 EDT  Csd

## 2024-10-22 ENCOUNTER — TELEPHONE (OUTPATIENT)
Dept: OBSTETRICS AND GYNECOLOGY | Facility: CLINIC | Age: 24
End: 2024-10-22
Payer: COMMERCIAL

## 2024-10-22 NOTE — TELEPHONE ENCOUNTER
Patient called, she delivered by C/S on 10/17/24, 5 days PP today. She states that she has swelling in her feet, ankles and calves. She reports one side is worse then the other. She states it is pitting. She states she has tried to elevate her feet at night but has had no improvement in the swelling. She denies HA or vision changes. She denies RUQ pain. She denies pain or redness in the calf that is more swollen. Advised patient that swelling can be common following delivery and can be worse for the first week after delivery and then it usually starts to improve. Advised patient to continue elevating her feet above heart level, use compression stockings, increase her water intake, walk around. Advised her to call the office if headache or vision changes occur, if pain or redness occurs in legs, if swelling worsens. Patient has a follow up scheduled for next week with Dr Bower.        -Gracia Moeller

## 2024-10-28 ENCOUNTER — PATIENT OUTREACH (OUTPATIENT)
Dept: LABOR AND DELIVERY | Facility: HOSPITAL | Age: 24
End: 2024-10-28
Payer: COMMERCIAL

## 2024-10-28 NOTE — OUTREACH NOTE
Motherhood Connection  Postpartum Check-In    Questions/Answers      Flowsheet Row Responses   Visit Setting Telephone   Best Method for Contacting Cell   OB Discharge Note Reviewed  Reviewed   OB Discharge Navigator Reviewed  Reviewed   OB Discharge Medications Reviewed  Reviewed    discharged home with mother? Yes   Current Pain Levels 0-10 2   Pain Location Abdomen   Pain Description Incisional   How do you treat your pain? Medications   Verbalized Emotional State Acceptance   Family/Support Network Significant Other   Level of Involvement in Care Supportive, Interactive   Do you feel comfortable in your relationship with your baby? Yes   Have members of your household adjusted to your baby? Yes   Is the baby's father supportive and/or involved with the baby? Yes   How does your partner feel about the baby? Involved, Happy   Do you feel safe at home, school and work? Yes   Are you in a relationship with someone who threatens you or hurts you? No   Do you have the resources to keep yourself and your baby healthy and safe? Yes   Lochia (per patient report) Rubra   Number of pads per day 4   Lochia Odor None   Is patient breastfeeding? No   How is breast suppression going? It's been fine   Postpartum Depression Screening Education Education Provided   Peripheral Blood Glucose --  [N/A]   Doctor Appointments: Education Provided   Breastfeeding Education Declined   Family Planning Education --  [Had blilateral salpingectomy]   Postpartum Care Education Education Provided   S & S to report Education Provided   Followup Appointments Made Yes   Well Child Visit Appointments Made Yes   Appointment Date 10/30/24   Provider/Agency [facility]   Well Child Checkup Provider Name Dr. Mullen   Well Child Check Up Date: 10/21/24   Did you complete the visit? Yes   Were there any specific concerns? Yes   Concerns: weight loss   Has additional follow-up with pediatrician or specialist been recommended? Yes   Follow-Up  Recommended: Increased feed frequency and weight checks   Umbilical Cord No reported signs or symptoms   Was the baby circumcised? No  [States she was told the baby does not have enough foreskin to do the circumcision now so has been referred to Pediatric Urology]   Infant Feeding Method Formula   Formula Type Other   Other Formula Similac 360   Formula PO (mL) 60-75   Formula/Expressed Milk frequency of feedings: Q2H during the day and Q3-4H at night   Number of wet diapers x 24 hours 12   Last BM x 24 hours 3   Emesis (Unmeasured Occurence) no   What safe sleep surface is available? Bassinet   Are there stuffed animals, toys, pillows, quilts, blankets, wedges, positioners, bumpers or other loose bedding in the infant's sleeping environment? No   Where does the baby usually sleep? Bassinet   Does the baby ever share a sleep surface with a sibling, adult or pet? No   Does the baby ever share a sleep surface in a bed, couch, recliner or other? No   What position do you place your baby to sleep for naps? Back   What position do you place your baby to sleep at night Back   Is the infant dressed appropiately for the temperature of the home? Yes            Review of Systems   Constitutional: Negative.    HENT: Negative.     Eyes: Negative.    Respiratory: Negative.     Cardiovascular:  Positive for leg swelling.   Gastrointestinal: Negative.    Endocrine: Negative.    Genitourinary:  Positive for vaginal bleeding.   Musculoskeletal: Negative.    Skin:  Positive for wound.        Abd incision   Allergic/Immunologic: Negative.    Neurological: Negative.    Hematological: Negative.    Psychiatric/Behavioral:  Positive for sleep disturbance.        Most Recent Amo  Depression Scale Score (EPDS)    Performed by a clinician: 2 (10/28/2024 11:13 AM)        5 Ps Screen  Complete    Feeling well since going home. Minimal pain and lochia WNL. States mood has been stable. Reports good family support. No longer  breastfeeding/pumping. Suppression measures reviewed.    Baby doing well with concerns of weight loss at follow up appointment. Increased feedings and will go for another check up Friday.     No community resource needs at present. Updated resources provided via [employer] message. RN from call center to f/u next week. Contact information provided. Encouraged to call with questions, concerns, or for support before then.    RAMOS Bruner RN  Maternity Nurse Navigator    10/28/2024, 12:00 EDT

## 2024-10-31 ENCOUNTER — POSTPARTUM VISIT (OUTPATIENT)
Dept: OBSTETRICS AND GYNECOLOGY | Facility: CLINIC | Age: 24
End: 2024-10-31
Payer: COMMERCIAL

## 2024-10-31 VITALS
DIASTOLIC BLOOD PRESSURE: 68 MMHG | WEIGHT: 217.4 LBS | SYSTOLIC BLOOD PRESSURE: 110 MMHG | HEIGHT: 67 IN | BODY MASS INDEX: 34.12 KG/M2

## 2024-10-31 NOTE — PROGRESS NOTES
"      Chief Complaint   Patient presents with    Postpartum Care     2 week       Postpartum Visit         Sari Newton is a 24 y.o.  who presents today for a 2 week(s) postpartum check.      C/S:   Fetal Intolerance, Prolapse of Cord      , Low Transverse   Information for the patient's :  Franco Beaulieu [[id number]]   10/17/2024   male   Franco Beaulieu   3544 g (7 lb 13 oz)   Gestational Age: 39w0d     Baby Discharged with Mom  Delivering MD: Divya Bower MD.    Pregnancy complications: no known issues    Patient reports her incision is clean, dry, intact. Patient describes vaginal bleeding as moderate to light. She is bottle feeding. She would like to discuss the following complaints today: None.    Had b/l salpingectomy for contraception.    Patient denies concerns for postpartum depression/anxiety. Patient denies  suicidal or homicidal ideation. Her postpartum depression screening questionnaire: 2. No treatment is indicated      The additional following portions of the patient's history were reviewed and updated as appropriate: allergies, current medications, past family history, past medical history, past social history, past surgical history, and problem list.      Review of Systems   All other systems reviewed and are negative.      I have reviewed and agree with the HPI, ROS, and historical information as entered above. Divya Bower MD      Objective   /68   Ht 170.2 cm (67.01\")   Wt 98.6 kg (217 lb 6.4 oz)   LMP 2024   Breastfeeding No   BMI 34.04 kg/m²     Physical Exam  PHYSICAL EXAM:    Abdomen: +BS, benign, no masses, soft, non-tender.  Incision: yes Well-healed, clean, dry, intact.  Bimanual exam: deferred.   Extremities: No deep calf tenderness.        Assessment and Plan    Problem List Items Addressed This Visit       2 weeks postpartum follow-up - Primary       S/p , 2 week(s) postpartum.  Doing well.    Continued pelvic " rest with a return to driving and light physical activity.  Baby doing well.  Bottlefeeding going well.  No si/sx of postpartum depression  Contraception: contraceptive methods: s/p B/L salpingectomy  No follow-ups on file.    Divya Bower MD  10/31/2024

## 2024-11-04 ENCOUNTER — PATIENT OUTREACH (OUTPATIENT)
Dept: CALL CENTER | Facility: HOSPITAL | Age: 24
End: 2024-11-04
Payer: COMMERCIAL

## 2024-11-04 NOTE — OUTREACH NOTE
Motherhood Connection Survey      Flowsheet Row Responses   [facility] facility patient discharged from? [facility]   Week 1 attempt successful? No   Unsuccessful attempts Attempt 1   Reschedule Today              Robi SHEA - Registered Nurse

## 2024-11-05 ENCOUNTER — PATIENT OUTREACH (OUTPATIENT)
Dept: CALL CENTER | Facility: HOSPITAL | Age: 24
End: 2024-11-05
Payer: COMMERCIAL

## 2024-11-05 NOTE — OUTREACH NOTE
Motherhood Connection Survey      Flowsheet Row Responses   [facility] patient discharged from? [place]   Week 1 attempt successful? Yes   Call start time 1330   Call end time 1335   Baby sex Boy    discharged home with mother? Yes   Baby sex Boy   Delivery type    Emotional state Acceptance   Family support Yes   Do you have all necessary resources to care for you and your baby?  Yes   Have members of your household adjusted to your baby? Yes   Did you have any problems with pre-eclampsia during this pregnancy? No   Did you have blood glucose issues during this pregnancy No   Lochia amount Light   Lochia per patient report --  [brownish]   Did you have an episiotomy/tear/abdominal incision? Yes   Feeding Method Bottle   Frequency q 2hrs   Amount 3 oz   Number of wet diapers x 24 hours 8-10   Last BM x 24 hours 1-2   Umbilical Cord No reported signs or symptoms   Was the baby circumcised? No   Where does the baby usually sleep? Bassinet   Are there stuffed animals, toys, pillows, quilts, blankets, wedges, positioners, bumpers or other loose bedding in the infant's sleeping environment? No   Does the baby ever share a sleep surface in a bed, couch, recliner or other? No   What position do you lay your baby down to sleep? Back   Are you and/or other caregivers smoking inside or outside the baby's home? No   Mom appointment comments: pp f/u done   Baby appointment comments: Peds visits done   Feeding method comment fdg intervals increased to q 2hrs   Call completed? Yes   How satisfied were you with the Motherhood Connection Program? 5              Siria HENRIQUEZ - Registered Nurse

## 2024-12-05 ENCOUNTER — POSTPARTUM VISIT (OUTPATIENT)
Dept: OBSTETRICS AND GYNECOLOGY | Facility: CLINIC | Age: 24
End: 2024-12-05
Payer: COMMERCIAL

## 2024-12-05 VITALS
SYSTOLIC BLOOD PRESSURE: 110 MMHG | WEIGHT: 225 LBS | BODY MASS INDEX: 35.31 KG/M2 | HEIGHT: 67 IN | DIASTOLIC BLOOD PRESSURE: 62 MMHG

## 2024-12-05 NOTE — PROGRESS NOTES
"        Chief Complaint   Patient presents with    Postpartum Care       Postpartum Visit         Sari Newton is a 24 y.o.  who presents today for a 7 week(s) postpartum check.     C/S: repeat     , Low Transverse   Information for the patient's :  Franco Beaulieu [[id number]]   10/17/2024   male   Franco Beaulieu   3544 g (7 lb 13 oz)   Gestational Age: 39w0d       Baby Discharged: Discharged with Mom  Delivering Physician: Divya Bowre MD    Her pregnancy was complicated by no known issues. The incision is healing well. Patient describes vaginal bleeding as light.  Patient is bottle feeding.  She desires a tubal ligation for contraception.      She would like to discuss the following complaints today: denies.    Patient denies concerns for postpartum depression/anxiety. Patient denies  suicidal or homicidal ideation. Her postpartum depression screening questionnaire: 7. No treatment is indicated      Last Pap : 2022. Results: negative. HPV: negative.   Last Completed Pap Smear            PAP SMEAR (Every 3 Years) Next due on 2025  LIQUID-BASED PAP SMEAR, [facility] ([facility])                      The additional following portions of the patient's history were reviewed and updated as appropriate: allergies and current medications.    Review of Systems  All other systems reviewed and are negative.     I have reviewed and agree with the HPI, ROS, and historical information as entered above. Divya Bower MD      /62   Ht 170.2 cm (67.01\")   Wt 102 kg (225 lb)   LMP 2024   Breastfeeding No   BMI 35.23 kg/m²     Physical Exam  PHYSICAL EXAM:    Abdomen: +BS, benign, no masses, soft, non-tender.  Incision: yes Well-healed, clean, dry, intact.  Bimanual exam: deferred   Extremities: No deep calf tenderness.         Assessment and Plan    Problem List Items Addressed This Visit       6 weeks postpartum follow-up - Primary "       S/p , bilateral salpingectomy, 6 week(s) postpartum.  Doing well.    Return to normal physical activity.  No pelvic restrictions.   Baby doing well.  Bottlefeeding going well.  No si/sx of postpartum depression  Return in about 1 year (around 2025) for Annual physical, Appropriate for followup with NP as desired..     Divya Bower MD  2024

## 2025-03-20 ENCOUNTER — HOSPITAL ENCOUNTER (EMERGENCY)
Facility: HOSPITAL | Age: 25
Discharge: HOME | End: 2025-03-20
Attending: EMERGENCY MEDICINE
Payer: COMMERCIAL

## 2025-03-20 VITALS
HEART RATE: 85 BPM | RESPIRATION RATE: 19 BRPM | WEIGHT: 227 LBS | OXYGEN SATURATION: 98 % | SYSTOLIC BLOOD PRESSURE: 134 MMHG | TEMPERATURE: 98.7 F | HEIGHT: 67 IN | BODY MASS INDEX: 35.63 KG/M2 | DIASTOLIC BLOOD PRESSURE: 76 MMHG

## 2025-03-20 DIAGNOSIS — N81.10 VAGINAL PROLAPSE WITHOUT UTERINE PROLAPSE: Primary | ICD-10-CM

## 2025-03-20 LAB
B-HCG UR QL: NEGATIVE
BACTERIA UR QL AUTO: ABNORMAL /HPF
BILIRUB UR QL STRIP: NEGATIVE
C TRACH RRNA CVX QL NAA+PROBE: NOT DETECTED
CLARITY UR: CLEAR
CLUE CELLS SPEC QL WET PREP: ABNORMAL
COLOR UR: YELLOW
GLUCOSE UR STRIP-MCNC: NEGATIVE MG/DL
HGB UR QL STRIP.AUTO: NEGATIVE
HYALINE CASTS UR QL AUTO: ABNORMAL /LPF
HYDATID CYST SPEC WET PREP: ABNORMAL
KETONES UR QL STRIP: NEGATIVE
KOH PREP NAIL: NORMAL
LEUKOCYTE ESTERASE UR QL STRIP.AUTO: ABNORMAL
N GONORRHOEA RRNA SPEC QL NAA+PROBE: NOT DETECTED
NITRITE UR QL STRIP: NEGATIVE
PH UR STRIP.AUTO: 8 [PH] (ref 5–8)
PROT UR QL STRIP: ABNORMAL
RBC # UR STRIP: ABNORMAL /HPF
REF LAB TEST METHOD: ABNORMAL
SP GR UR STRIP: 1.03 (ref 1–1.03)
SQUAMOUS #/AREA URNS HPF: ABNORMAL /HPF
T VAGINALIS SPEC QL WET PREP: ABNORMAL
UROBILINOGEN UR QL STRIP: ABNORMAL
WBC # UR STRIP: ABNORMAL /HPF
WBC SPEC QL WET PREP: ABNORMAL
YEAST GENITAL QL WET PREP: ABNORMAL

## 2025-03-20 PROCEDURE — 87220 TISSUE EXAM FOR FUNGI: CPT | Performed by: EMERGENCY MEDICINE

## 2025-03-20 PROCEDURE — 81001 URINALYSIS AUTO W/SCOPE: CPT | Performed by: EMERGENCY MEDICINE

## 2025-03-20 PROCEDURE — 81025 URINE PREGNANCY TEST: CPT | Performed by: EMERGENCY MEDICINE

## 2025-03-20 PROCEDURE — 99284 EMERGENCY DEPT VISIT MOD MDM: CPT

## 2025-03-20 PROCEDURE — 87210 SMEAR WET MOUNT SALINE/INK: CPT | Performed by: EMERGENCY MEDICINE

## 2025-03-20 PROCEDURE — 87491 CHLMYD TRACH DNA AMP PROBE: CPT | Performed by: EMERGENCY MEDICINE

## 2025-03-20 PROCEDURE — 87591 N.GONORRHOEAE DNA AMP PROB: CPT | Performed by: EMERGENCY MEDICINE

## 2025-03-20 NOTE — Clinical Note
Psychiatric EMERGENCY DEPARTMENT  [address]  [facility] [address]  Phone: [phone]    Sari Newton was seen and treated in our emergency department on 3/20/2025.  She may return to work on 03/21/2025.         Thank you for choosing [facility].    Sarah Long MD
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
## (undated) DEVICE — SUT VIC 2/0 CT1 27IN J259H

## (undated) DEVICE — SOL IRR H2O BTL 1000ML STRL

## (undated) DEVICE — SOL IRR NACL 0.9PCT BT 1000ML

## (undated) DEVICE — PREVENA INCISION MANAGEMENT SYSTEM- PEEL & PLACE DRESSING: Brand: PREVENA™ PEEL & PLACE™

## (undated) DEVICE — PK C/SECT 10

## (undated) DEVICE — ANTIBACTERIAL UNDYED BRAIDED (POLYGLACTIN 910), SYNTHETIC ABSORBABLE SUTURE: Brand: COATED VICRYL

## (undated) DEVICE — 4-PORT MANIFOLD: Brand: NEPTUNE 2

## (undated) DEVICE — ADHS SKIN PREMIERPRO EXOFIN TOPICAL HI/VISC .5ML

## (undated) DEVICE — TRAP FLD MINIVAC MEGADYNE 100ML

## (undated) DEVICE — VIOLET BRAIDED (POLYGLACTIN 910), SYNTHETIC ABSORBABLE SUTURE: Brand: COATED VICRYL

## (undated) DEVICE — SUT VIC 2/0 CT1 27IN J339H BX/36

## (undated) DEVICE — APPL CHLORAPREP W/TINT 26ML BLU

## (undated) DEVICE — PENCL SMOKE/EVAC MEGADYNE TELESCP 10FT

## (undated) DEVICE — MAT PREVALON MOBL TRANSFR AIR WO/PAD 39X80IN

## (undated) DEVICE — GLV SURG BIOGEL LTX PF 6 1/2

## (undated) DEVICE — TRY SPINE BLCK WHITACRE 25G 3X5IN

## (undated) DEVICE — PATIENT RETURN ELECTRODE, SINGLE-USE, CONTACT QUALITY MONITORING, ADULT, WITH 9FT CORD, FOR PATIENTS WEIGING OVER 33LBS. (15KG): Brand: MEGADYNE